# Patient Record
Sex: FEMALE | Race: WHITE | NOT HISPANIC OR LATINO | Employment: FULL TIME | URBAN - NONMETROPOLITAN AREA
[De-identification: names, ages, dates, MRNs, and addresses within clinical notes are randomized per-mention and may not be internally consistent; named-entity substitution may affect disease eponyms.]

---
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
## 2019-05-15 ENCOUNTER — TRANSCRIBE ORDERS (OUTPATIENT)
Dept: ADMINISTRATIVE | Facility: HOSPITAL | Age: 19
End: 2019-05-15

## 2019-05-15 DIAGNOSIS — J45.909 ASTHMA, ACUTE: Primary | ICD-10-CM

## 2019-06-04 ENCOUNTER — HOSPITAL ENCOUNTER (EMERGENCY)
Facility: HOSPITAL | Age: 19
Discharge: HOME | End: 2019-06-04
Admitting: EMERGENCY MEDICINE

## 2019-06-04 VITALS
SYSTOLIC BLOOD PRESSURE: 112 MMHG | TEMPERATURE: 98.8 F | OXYGEN SATURATION: 100 % | RESPIRATION RATE: 18 BRPM | BODY MASS INDEX: 31.34 KG/M2 | WEIGHT: 195 LBS | HEIGHT: 66 IN | HEART RATE: 84 BPM | DIASTOLIC BLOOD PRESSURE: 64 MMHG

## 2019-06-04 DIAGNOSIS — T78.40XA ALLERGIC REACTION, INITIAL ENCOUNTER: Primary | ICD-10-CM

## 2019-06-04 PROCEDURE — 99283 EMERGENCY DEPT VISIT LOW MDM: CPT

## 2019-06-04 PROCEDURE — 96372 THER/PROPH/DIAG INJ SC/IM: CPT

## 2019-06-04 PROCEDURE — 25010000002 DEXAMETHASONE SODIUM PHOSPHATE 20 MG/5ML SOLUTION: Performed by: NURSE PRACTITIONER

## 2019-06-04 RX ORDER — DEXAMETHASONE SODIUM PHOSPHATE 4 MG/ML
6 INJECTION, SOLUTION INTRA-ARTICULAR; INTRALESIONAL; INTRAMUSCULAR; INTRAVENOUS; SOFT TISSUE ONCE
Status: DISCONTINUED | OUTPATIENT
Start: 2019-06-04 | End: 2019-06-04

## 2019-06-04 RX ORDER — ATENOLOL 25 MG/1
25 TABLET ORAL EVERY MORNING
COMMUNITY
End: 2022-01-26

## 2019-06-04 RX ORDER — METHYLPREDNISOLONE 4 MG/1
TABLET ORAL
Qty: 1 EACH | Refills: 0 | Status: SHIPPED | OUTPATIENT
Start: 2019-06-04 | End: 2022-01-26

## 2019-06-04 RX ORDER — DEXAMETHASONE SODIUM PHOSPHATE 4 MG/ML
6 INJECTION, SOLUTION INTRA-ARTICULAR; INTRALESIONAL; INTRAMUSCULAR; INTRAVENOUS; SOFT TISSUE ONCE
Status: COMPLETED | OUTPATIENT
Start: 2019-06-04 | End: 2019-06-04

## 2019-06-04 RX ADMIN — DEXAMETHASONE SODIUM PHOSPHATE 6 MG: 4 INJECTION, SOLUTION INTRAMUSCULAR; INTRAVENOUS at 20:39

## 2019-06-05 ENCOUNTER — HOSPITAL ENCOUNTER (EMERGENCY)
Facility: HOSPITAL | Age: 19
Discharge: HOME | End: 2019-06-05
Attending: EMERGENCY MEDICINE | Admitting: EMERGENCY MEDICINE

## 2019-06-05 ENCOUNTER — HOSPITAL ENCOUNTER (EMERGENCY)
Facility: HOSPITAL | Age: 19
Discharge: HOME | End: 2019-06-05
Attending: FAMILY MEDICINE | Admitting: FAMILY MEDICINE

## 2019-06-05 VITALS
BODY MASS INDEX: 31.34 KG/M2 | HEART RATE: 112 BPM | TEMPERATURE: 98 F | WEIGHT: 195 LBS | RESPIRATION RATE: 16 BRPM | DIASTOLIC BLOOD PRESSURE: 76 MMHG | OXYGEN SATURATION: 99 % | SYSTOLIC BLOOD PRESSURE: 120 MMHG | HEIGHT: 66 IN

## 2019-06-05 VITALS
BODY MASS INDEX: 31.34 KG/M2 | DIASTOLIC BLOOD PRESSURE: 75 MMHG | TEMPERATURE: 98.2 F | WEIGHT: 195 LBS | RESPIRATION RATE: 18 BRPM | HEIGHT: 66 IN | HEART RATE: 108 BPM | OXYGEN SATURATION: 98 % | SYSTOLIC BLOOD PRESSURE: 125 MMHG

## 2019-06-05 DIAGNOSIS — L50.9 HIVES: Primary | ICD-10-CM

## 2019-06-05 LAB
ALBUMIN SERPL-MCNC: 4.15 G/DL (ref 3.5–5.2)
ALBUMIN SERPL-MCNC: 4.43 G/DL (ref 3.5–5.2)
ALBUMIN/GLOB SERPL: 1.2 G/DL
ALBUMIN/GLOB SERPL: 1.4 G/DL
ALP SERPL-CCNC: 77 U/L (ref 39–117)
ALP SERPL-CCNC: 82 U/L (ref 39–117)
ALT SERPL W P-5'-P-CCNC: 13 U/L (ref 1–33)
ALT SERPL W P-5'-P-CCNC: 18 U/L (ref 1–33)
ANION GAP SERPL CALCULATED.3IONS-SCNC: 14.4 MMOL/L
ANION GAP SERPL CALCULATED.3IONS-SCNC: 15.2 MMOL/L
AST SERPL-CCNC: 14 U/L (ref 1–32)
AST SERPL-CCNC: 17 U/L (ref 1–32)
B-HCG UR QL: NEGATIVE
BASOPHILS # BLD AUTO: 0.01 10*3/MM3 (ref 0–0.2)
BASOPHILS NFR BLD AUTO: 0 % (ref 0–1.5)
BILIRUB SERPL-MCNC: <0.2 MG/DL (ref 0.2–1.2)
BILIRUB SERPL-MCNC: <0.2 MG/DL (ref 0.2–1.2)
BUN BLD-MCNC: 14 MG/DL (ref 6–20)
BUN BLD-MCNC: 16 MG/DL (ref 6–20)
BUN/CREAT SERPL: 18.7 (ref 7–25)
BUN/CREAT SERPL: 20 (ref 7–25)
CALCIUM SPEC-SCNC: 9.3 MG/DL (ref 8.6–10.5)
CALCIUM SPEC-SCNC: 9.5 MG/DL (ref 8.6–10.5)
CHLORIDE SERPL-SCNC: 104 MMOL/L (ref 98–107)
CHLORIDE SERPL-SCNC: 106 MMOL/L (ref 98–107)
CO2 SERPL-SCNC: 18.6 MMOL/L (ref 22–29)
CO2 SERPL-SCNC: 19.8 MMOL/L (ref 22–29)
CREAT BLD-MCNC: 0.75 MG/DL (ref 0.57–1)
CREAT BLD-MCNC: 0.8 MG/DL (ref 0.57–1)
D-LACTATE SERPL-SCNC: 1.8 MMOL/L (ref 0.5–2)
DEPRECATED RDW RBC AUTO: 38.6 FL (ref 37–54)
DEPRECATED RDW RBC AUTO: 38.8 FL (ref 37–54)
EOSINOPHIL # BLD AUTO: 0 10*3/MM3 (ref 0–0.4)
EOSINOPHIL NFR BLD AUTO: 0 % (ref 0.3–6.2)
ERYTHROCYTE [DISTWIDTH] IN BLOOD BY AUTOMATED COUNT: 13.4 % (ref 12.3–15.4)
ERYTHROCYTE [DISTWIDTH] IN BLOOD BY AUTOMATED COUNT: 13.5 % (ref 12.3–15.4)
GFR SERPL CREATININE-BSD FRML MDRD: 100 ML/MIN/1.73
GFR SERPL CREATININE-BSD FRML MDRD: 92 ML/MIN/1.73
GLOBULIN UR ELPH-MCNC: 3.3 GM/DL
GLOBULIN UR ELPH-MCNC: 3.5 GM/DL
GLUCOSE BLD-MCNC: 141 MG/DL (ref 65–99)
GLUCOSE BLD-MCNC: 167 MG/DL (ref 65–99)
HCT VFR BLD AUTO: 38.9 % (ref 34–46.6)
HCT VFR BLD AUTO: 40.5 % (ref 34–46.6)
HGB BLD-MCNC: 12.6 G/DL (ref 12–15.9)
HGB BLD-MCNC: 13.2 G/DL (ref 12–15.9)
HOLD SPECIMEN: NORMAL
HOLD SPECIMEN: NORMAL
IMM GRANULOCYTES # BLD AUTO: 0.11 10*3/MM3 (ref 0–0.05)
IMM GRANULOCYTES NFR BLD AUTO: 0.5 % (ref 0–0.5)
LYMPHOCYTES # BLD AUTO: 1.53 10*3/MM3 (ref 0.7–3.1)
LYMPHOCYTES # BLD MANUAL: 1.37 10*3/MM3 (ref 0.7–3.1)
LYMPHOCYTES NFR BLD AUTO: 6.5 % (ref 19.6–45.3)
LYMPHOCYTES NFR BLD MANUAL: 3 % (ref 5–12)
LYMPHOCYTES NFR BLD MANUAL: 8 % (ref 19.6–45.3)
MCH RBC QN AUTO: 26.1 PG (ref 26.6–33)
MCH RBC QN AUTO: 26.2 PG (ref 26.6–33)
MCHC RBC AUTO-ENTMCNC: 32.4 G/DL (ref 31.5–35.7)
MCHC RBC AUTO-ENTMCNC: 32.6 G/DL (ref 31.5–35.7)
MCV RBC AUTO: 80.4 FL (ref 79–97)
MCV RBC AUTO: 80.5 FL (ref 79–97)
MONOCYTES # BLD AUTO: 0.16 10*3/MM3 (ref 0.1–0.9)
MONOCYTES # BLD AUTO: 0.51 10*3/MM3 (ref 0.1–0.9)
MONOCYTES NFR BLD AUTO: 0.7 % (ref 5–12)
NEUTROPHILS # BLD AUTO: 15.26 10*3/MM3 (ref 1.7–7)
NEUTROPHILS # BLD AUTO: 21.58 10*3/MM3 (ref 1.7–7)
NEUTROPHILS NFR BLD AUTO: 92.3 % (ref 42.7–76)
NEUTROPHILS NFR BLD MANUAL: 89 % (ref 42.7–76)
PLATELET # BLD AUTO: 358 10*3/MM3 (ref 140–450)
PLATELET # BLD AUTO: 375 10*3/MM3 (ref 140–450)
PMV BLD AUTO: 10.7 FL (ref 6–12)
PMV BLD AUTO: 11 FL (ref 6–12)
POTASSIUM BLD-SCNC: 4.1 MMOL/L (ref 3.5–5.2)
POTASSIUM BLD-SCNC: 4.5 MMOL/L (ref 3.5–5.2)
PROT SERPL-MCNC: 7.6 G/DL (ref 6–8.5)
PROT SERPL-MCNC: 7.7 G/DL (ref 6–8.5)
RBC # BLD AUTO: 4.83 10*6/MM3 (ref 3.77–5.28)
RBC # BLD AUTO: 5.04 10*6/MM3 (ref 3.77–5.28)
RBC MORPH BLD: NORMAL
SCAN SLIDE: NORMAL
SMALL PLATELETS BLD QL SMEAR: ADEQUATE
SODIUM BLD-SCNC: 139 MMOL/L (ref 136–145)
SODIUM BLD-SCNC: 139 MMOL/L (ref 136–145)
TSH SERPL DL<=0.05 MIU/L-ACNC: 0.89 MIU/ML (ref 0.27–4.2)
WBC NRBC COR # BLD: 17.15 10*3/MM3 (ref 3.4–10.8)
WBC NRBC COR # BLD: 23.39 10*3/MM3 (ref 3.4–10.8)
WHOLE BLOOD HOLD SPECIMEN: NORMAL
WHOLE BLOOD HOLD SPECIMEN: NORMAL

## 2019-06-05 PROCEDURE — 80053 COMPREHEN METABOLIC PANEL: CPT | Performed by: FAMILY MEDICINE

## 2019-06-05 PROCEDURE — 25010000002 DIPHENHYDRAMINE PER 50 MG: Performed by: PHYSICIAN ASSISTANT

## 2019-06-05 PROCEDURE — 85007 BL SMEAR W/DIFF WBC COUNT: CPT | Performed by: PHYSICIAN ASSISTANT

## 2019-06-05 PROCEDURE — 80053 COMPREHEN METABOLIC PANEL: CPT | Performed by: PHYSICIAN ASSISTANT

## 2019-06-05 PROCEDURE — 96375 TX/PRO/DX INJ NEW DRUG ADDON: CPT

## 2019-06-05 PROCEDURE — 25010000002 METHYLPREDNISOLONE PER 125 MG: Performed by: PHYSICIAN ASSISTANT

## 2019-06-05 PROCEDURE — 84443 ASSAY THYROID STIM HORMONE: CPT | Performed by: PHYSICIAN ASSISTANT

## 2019-06-05 PROCEDURE — 87040 BLOOD CULTURE FOR BACTERIA: CPT | Performed by: EMERGENCY MEDICINE

## 2019-06-05 PROCEDURE — 83605 ASSAY OF LACTIC ACID: CPT | Performed by: EMERGENCY MEDICINE

## 2019-06-05 PROCEDURE — 99284 EMERGENCY DEPT VISIT MOD MDM: CPT

## 2019-06-05 PROCEDURE — 85025 COMPLETE CBC W/AUTO DIFF WBC: CPT | Performed by: EMERGENCY MEDICINE

## 2019-06-05 PROCEDURE — 36415 COLL VENOUS BLD VENIPUNCTURE: CPT

## 2019-06-05 PROCEDURE — 96374 THER/PROPH/DIAG INJ IV PUSH: CPT

## 2019-06-05 PROCEDURE — 99283 EMERGENCY DEPT VISIT LOW MDM: CPT

## 2019-06-05 PROCEDURE — 85025 COMPLETE CBC W/AUTO DIFF WBC: CPT | Performed by: PHYSICIAN ASSISTANT

## 2019-06-05 PROCEDURE — 81025 URINE PREGNANCY TEST: CPT | Performed by: PHYSICIAN ASSISTANT

## 2019-06-05 RX ORDER — SODIUM CHLORIDE 0.9 % (FLUSH) 0.9 %
10 SYRINGE (ML) INJECTION AS NEEDED
Status: DISCONTINUED | OUTPATIENT
Start: 2019-06-05 | End: 2019-06-05

## 2019-06-05 RX ORDER — DIPHENHYDRAMINE HYDROCHLORIDE 50 MG/ML
25 INJECTION INTRAMUSCULAR; INTRAVENOUS ONCE
Status: COMPLETED | OUTPATIENT
Start: 2019-06-05 | End: 2019-06-05

## 2019-06-05 RX ORDER — METHYLPREDNISOLONE SODIUM SUCCINATE 125 MG/2ML
80 INJECTION, POWDER, LYOPHILIZED, FOR SOLUTION INTRAMUSCULAR; INTRAVENOUS ONCE
Status: COMPLETED | OUTPATIENT
Start: 2019-06-05 | End: 2019-06-05

## 2019-06-05 RX ORDER — FAMOTIDINE 10 MG/ML
20 INJECTION, SOLUTION INTRAVENOUS ONCE
Status: COMPLETED | OUTPATIENT
Start: 2019-06-05 | End: 2019-06-05

## 2019-06-05 RX ORDER — DIPHENHYDRAMINE HCL 25 MG
25 TABLET ORAL EVERY 6 HOURS PRN
Qty: 30 TABLET | Refills: 0 | Status: SHIPPED | OUTPATIENT
Start: 2019-06-05 | End: 2022-01-26

## 2019-06-05 RX ORDER — SODIUM CHLORIDE 0.9 % (FLUSH) 0.9 %
10 SYRINGE (ML) INJECTION AS NEEDED
Status: DISCONTINUED | OUTPATIENT
Start: 2019-06-05 | End: 2019-06-05 | Stop reason: HOSPADM

## 2019-06-05 RX ADMIN — METHYLPREDNISOLONE SODIUM SUCCINATE 80 MG: 125 INJECTION, POWDER, FOR SOLUTION INTRAMUSCULAR; INTRAVENOUS at 08:51

## 2019-06-05 RX ADMIN — DIPHENHYDRAMINE HYDROCHLORIDE 25 MG: 50 INJECTION INTRAMUSCULAR; INTRAVENOUS at 08:52

## 2019-06-05 RX ADMIN — FAMOTIDINE 20 MG: 10 INJECTION, SOLUTION INTRAVENOUS at 08:54

## 2019-06-05 NOTE — ED PROVIDER NOTES
Subjective   18-year-old white female presents secondary to allergic reaction.  She has rash to her arms and legs.  She states earlier she felt short of breath.  She was seen yesterday and given a steroid shot however she has not been taking antihistamines.  She states that her rash seems a little worse today.  She has been itching.  No difficulty swallowing.  No fever.  She was previously on penicillin due to strep and a double ear infection.  She states this is all resolved.  Symptoms are moderate.            Review of Systems   Constitutional: Negative.  Negative for fever.   HENT: Negative.    Respiratory: Negative.    Cardiovascular: Negative.  Negative for chest pain.   Gastrointestinal: Negative.  Negative for abdominal pain.   Endocrine: Negative.    Genitourinary: Negative.  Negative for dysuria.   Skin: Positive for rash.   Neurological: Negative.    Psychiatric/Behavioral: Negative.    All other systems reviewed and are negative.      Past Medical History:   Diagnosis Date   • Asthma    • Fast heart beat    • GERD (gastroesophageal reflux disease)        Allergies   Allergen Reactions   • Codeine Anaphylaxis   • Bactrim [Sulfamethoxazole-Trimethoprim] Hives   • Penicillins Rash       Past Surgical History:   Procedure Laterality Date   • D&C WITH SUCTION N/A 11/29/2018    Procedure: DILATATION AND CURETTAGE WITH SUCTION;  Surgeon: Estela Gupta DO;  Location: [facility];  Service: Obstetrics/Gynecology       Family History   Problem Relation Age of Onset   • Hypertension Father        Social History     Socioeconomic History   • Marital status: Single     Spouse name: Not on file   • Number of children: Not on file   • Years of education: Not on file   • Highest education level: Not on file   Tobacco Use   • Smoking status: Never Smoker   • Smokeless tobacco: Never Used   Substance and Sexual Activity   • Alcohol use: No   • Drug use: No   • Sexual activity: Yes     Birth control/protection: None            Objective   Physical Exam   Constitutional: She is oriented to person, place, and time. She appears well-developed and well-nourished. No distress.   HENT:   Head: Normocephalic and atraumatic.   Right Ear: External ear normal.   Left Ear: External ear normal.   Nose: Nose normal.   Eyes: Conjunctivae and EOM are normal. Pupils are equal, round, and reactive to light.   Neck: Normal range of motion. Neck supple. No JVD present. No tracheal deviation present.   Cardiovascular: Normal rate, regular rhythm and normal heart sounds.   No murmur heard.  Pulmonary/Chest: Effort normal and breath sounds normal. No respiratory distress. She has no wheezes.   Abdominal: Soft. Bowel sounds are normal. There is no tenderness.   Musculoskeletal: Normal range of motion. She exhibits no edema or deformity.   Neurological: She is alert and oriented to person, place, and time. No cranial nerve deficit.   Skin: Skin is warm and dry. No rash noted. She is not diaphoretic. No erythema. No pallor.   Hives to arms legs and torso.  No angioedema.  Lungs are clear.   Psychiatric: She has a normal mood and affect. Her behavior is normal. Thought content normal.   Nursing note and vitals reviewed.      Procedures           ED Course  ED Course as of Jun 05 1026 Wed Jun 05, 2019   0937 Patient's heart rate is 106.  She states that this is why she takes beta-blockers.  She states without beta-blockers her heart rate usually in the 130s 140s range.  She has not had fever.  Her white count is elevated however she has been on steroids.  She states that she was on penicillin for strep throat along with ear infections though this feels much better.  She is otherwise asymptomatic.  Her rash is resolving.  She has no shortness of breath at this time.  It is felt that patient is safe to be discharged on steroids, antihistamines and Pepcid.  She has been counseled about the signs and symptoms of worsening along with appropriate follow-up.   She does request to have a TSH added to her blood work due to her tachycardia and this has apparently not been checked in the past.  She was counseled that I will not probably see this result though she will follow-up with her primary care regarding it.  She is to continue her previously prescribed Medrol Dosepak along with Zantac which she takes chronically.  We will add Benadryl.   [JI]      ED Course User Index  [JI] Rolando Ferraro PA                  MDM  Number of Diagnoses or Management Options     Amount and/or Complexity of Data Reviewed  Decide to obtain previous medical records or to obtain history from someone other than the patient: yes          Final diagnoses:   Rolando Martínez PA  06/05/19 0942       Rolando Ferraro PA  06/05/19 1026

## 2019-06-05 NOTE — ED PROVIDER NOTES
Subjective   18-year-old female presents the ED today for an allergic reaction.  She states this started approximately 36 hours ago.  She has recently been on amoxicillin states she took her last dose 2 days ago.  She states she was seen in the ER yesterday and was prescribed Medrol Dosepak.  She states the rash came back this morning so she was seen in the ER again and received steroids, Pepcid and Benadryl in the ER and her rash completely resolved.  She states just prior to arrival she ate a pepperoni hot pocket and drank a glass of milk and her rash returned.  She states she has the rash on her arms and legs in her hands have been swelling.  She denies any known recent tick bites.  She denies any new chemical contacts.  She states she did take a dose of oral steroids after leaving the ER this morning.  Her last dose of Benadryl was around 9 AM this morning.  She denies any shortness of breath or difficulty swallowing.        History provided by:  Patient  Allergic Reaction   Presenting symptoms: itching and rash    Severity:  Moderate  Duration:  2 days  Prior allergic episodes:  Allergies to medications  Context: medications    Context: not chemicals, not cosmetics, not food allergies, not insect bite/sting, not new detergents/soaps and not poison ivy    Relieved by:  Antihistamines and steroids  Worsened by:  Nothing      Review of Systems   Constitutional: Negative.    HENT: Negative.    Eyes: Negative.    Respiratory: Negative.    Cardiovascular: Negative.    Gastrointestinal: Negative.    Genitourinary: Negative.    Musculoskeletal: Negative.    Skin: Positive for itching and rash.   Neurological: Negative.    Psychiatric/Behavioral: Negative.    All other systems reviewed and are negative.      Past Medical History:   Diagnosis Date   • Asthma    • Fast heart beat    • GERD (gastroesophageal reflux disease)        Allergies   Allergen Reactions   • Codeine Anaphylaxis   • Bactrim  [Sulfamethoxazole-Trimethoprim] Hives   • Penicillins Rash       Past Surgical History:   Procedure Laterality Date   • D&C WITH SUCTION N/A 11/29/2018    Procedure: DILATATION AND CURETTAGE WITH SUCTION;  Surgeon: Estela Gupta DO;  Location: [facility];  Service: Obstetrics/Gynecology       Family History   Problem Relation Age of Onset   • Hypertension Father        Social History     Socioeconomic History   • Marital status: Single     Spouse name: Not on file   • Number of children: Not on file   • Years of education: Not on file   • Highest education level: Not on file   Tobacco Use   • Smoking status: Never Smoker   • Smokeless tobacco: Never Used   Substance and Sexual Activity   • Alcohol use: No   • Drug use: No   • Sexual activity: Yes     Birth control/protection: None           Objective   Physical Exam   Constitutional: She is oriented to person, place, and time. She appears well-developed and well-nourished. No distress.   HENT:   Head: Normocephalic and atraumatic.   Right Ear: External ear normal.   Left Ear: External ear normal.   Nose: Nose normal.   Mouth/Throat: Oropharynx is clear and moist.   Eyes: Conjunctivae and EOM are normal. Pupils are equal, round, and reactive to light.   Neck: Normal range of motion. Neck supple.   Cardiovascular: Regular rhythm, normal heart sounds and intact distal pulses. Tachycardia present.   Pulmonary/Chest: Effort normal and breath sounds normal. No stridor. She has no wheezes.   Abdominal: Soft. Bowel sounds are normal. There is no tenderness.   Musculoskeletal: Normal range of motion.   Lymphadenopathy:     She has no cervical adenopathy.   Neurological: She is alert and oriented to person, place, and time.   Skin: Skin is warm. Capillary refill takes less than 2 seconds. Rash noted.   Mild urticarial rash to her bilateral lower arms.  No rash to trunk, face or lower extremities at this time.   Psychiatric: She has a normal mood and affect. Her behavior is  normal. Judgment and thought content normal.   Nursing note and vitals reviewed.      Procedures           ED Course  ED Course as of Jun 05 1558   Wed Jun 05, 2019   1532 Patient's rash is very mild, only on her arms at this time.  It is time for patient to have another dose of Benadryl.  She does not have a  at this time, she states she can take a dose upon arrival home.  I recommended that she follow up with her PCP to be referred to an allergist.  She will return to the ED if her symptoms change or worsen.  [AH]   1543 WBC count is elevated due to patient being on steroids.  No evidence of infection or sepsis.  []      ED Course User Index  [] Iram Syed PA                  MDM  Number of Diagnoses or Management Options  Hives:      Amount and/or Complexity of Data Reviewed  Clinical lab tests: reviewed  Decide to obtain previous medical records or to obtain history from someone other than the patient: yes    Patient Progress  Patient progress: improved        Final diagnoses:   Hives            Iram Syed PA  06/05/19 [id number]

## 2019-06-05 NOTE — ED PROVIDER NOTES
Subjective     History provided by:  Patient   used: No    Rash   Location:  Full body  Quality: itchiness and redness    Severity:  Moderate  Onset quality:  Sudden  Duration:  1 day  Timing:  Constant  Progression:  Waxing and waning  Chronicity:  New  Context: not animal contact, not diapers, not hot tub use, not new detergent/soap, not plant contact, not sick contacts and not sun exposure    Relieved by:  Nothing  Worsened by:  Nothing  Ineffective treatments:  None tried  Associated symptoms: no abdominal pain, no diarrhea, no fatigue, no headaches, no joint pain, no periorbital edema, no throat swelling, no tongue swelling, no URI, not vomiting and not wheezing        Review of Systems   Constitutional: Negative.  Negative for fatigue.   HENT: Negative.    Eyes: Negative.    Respiratory: Negative.  Negative for wheezing.    Cardiovascular: Negative.    Gastrointestinal: Negative.  Negative for abdominal pain, diarrhea and vomiting.   Endocrine: Negative.    Genitourinary: Negative.    Musculoskeletal: Negative.  Negative for arthralgias.   Skin: Positive for rash.   Allergic/Immunologic: Negative.    Neurological: Negative.  Negative for headaches.   Hematological: Negative.    Psychiatric/Behavioral: Negative.        Past Medical History:   Diagnosis Date   • Asthma    • Fast heart beat    • GERD (gastroesophageal reflux disease)        Allergies   Allergen Reactions   • Codeine Anaphylaxis   • Bactrim [Sulfamethoxazole-Trimethoprim] Hives   • Penicillins Rash       Past Surgical History:   Procedure Laterality Date   • D&C WITH SUCTION N/A 11/29/2018    Procedure: DILATATION AND CURETTAGE WITH SUCTION;  Surgeon: Estela Gupta DO;  Location: [facility];  Service: Obstetrics/Gynecology       Family History   Problem Relation Age of Onset   • Hypertension Father        Social History     Socioeconomic History   • Marital status: Single     Spouse name: Not on file   • Number of children:  Not on file   • Years of education: Not on file   • Highest education level: Not on file   Tobacco Use   • Smoking status: Never Smoker   • Smokeless tobacco: Never Used   Substance and Sexual Activity   • Alcohol use: No   • Drug use: No   • Sexual activity: Yes     Birth control/protection: None           Objective   Physical Exam   Constitutional: She is oriented to person, place, and time. She appears well-developed and well-nourished.   HENT:   Head: Normocephalic.   Right Ear: External ear normal.   Left Ear: External ear normal.   Mouth/Throat: Oropharynx is clear and moist.   Eyes: Conjunctivae and EOM are normal. Pupils are equal, round, and reactive to light.   Neck: Normal range of motion. Neck supple.   Cardiovascular: Normal rate, regular rhythm, normal heart sounds and intact distal pulses.   Pulmonary/Chest: Effort normal and breath sounds normal.   Abdominal: Soft. Bowel sounds are normal.   Musculoskeletal: Normal range of motion.   Neurological: She is alert and oriented to person, place, and time.   Skin: Skin is warm and dry. Capillary refill takes less than 2 seconds. Rash (generalized urticarial rash) noted.   Psychiatric: She has a normal mood and affect. Her behavior is normal. Thought content normal.   Nursing note and vitals reviewed.      Procedures           ED Course                  MDM      Final diagnoses:   Allergic reaction, initial encounter            Kael Christopher, APRN  06/04/19 2028

## 2019-06-05 NOTE — ED NOTES
"Pt became \"dizzy\" immediately after shot. Placed pt on strether and told her rest for a few minutes and explained what the vaso-vagal response is and why it happens. Pt verbalizes passing out after shots in the past.     Meme Beach RN  06/04/19 2044    "

## 2019-06-10 LAB
BACTERIA SPEC AEROBE CULT: NORMAL
BACTERIA SPEC AEROBE CULT: NORMAL

## 2019-12-11 ENCOUNTER — HOSPITAL ENCOUNTER (EMERGENCY)
Facility: HOSPITAL | Age: 19
Discharge: LEFT BEFORE DISCHARGE | End: 2019-12-11

## 2019-12-11 VITALS
HEART RATE: 101 BPM | HEIGHT: 65 IN | RESPIRATION RATE: 18 BRPM | TEMPERATURE: 98.9 F | BODY MASS INDEX: 31.65 KG/M2 | DIASTOLIC BLOOD PRESSURE: 79 MMHG | WEIGHT: 190 LBS | OXYGEN SATURATION: 97 % | SYSTOLIC BLOOD PRESSURE: 144 MMHG

## 2020-04-08 ENCOUNTER — LAB REQUISITION (OUTPATIENT)
Dept: LAB | Facility: HOSPITAL | Age: 20
End: 2020-04-08

## 2020-04-08 DIAGNOSIS — Z00.00 ROUTINE GENERAL MEDICAL EXAMINATION AT A HEALTH CARE FACILITY: ICD-10-CM

## 2020-04-08 PROCEDURE — 82731 ASSAY OF FETAL FIBRONECTIN: CPT | Performed by: PHYSICIAN ASSISTANT

## 2020-04-09 LAB — FIBRONECTIN FETAL VAG QL: NEGATIVE

## 2021-01-25 ENCOUNTER — HOSPITAL ENCOUNTER (EMERGENCY)
Facility: HOSPITAL | Age: 21
Discharge: LEFT BEFORE DISCHARGE | End: 2021-01-25

## 2021-01-25 VITALS
BODY MASS INDEX: 34.15 KG/M2 | SYSTOLIC BLOOD PRESSURE: 136 MMHG | DIASTOLIC BLOOD PRESSURE: 90 MMHG | OXYGEN SATURATION: 96 % | RESPIRATION RATE: 18 BRPM | HEIGHT: 64 IN | TEMPERATURE: 98.3 F | HEART RATE: 96 BPM | WEIGHT: 200 LBS

## 2021-01-25 PROCEDURE — 99211 OFF/OP EST MAY X REQ PHY/QHP: CPT

## 2021-11-16 ENCOUNTER — TRANSCRIBE ORDERS (OUTPATIENT)
Dept: ADMINISTRATIVE | Facility: HOSPITAL | Age: 21
End: 2021-11-16

## 2021-11-16 DIAGNOSIS — R11.0 NAUSEA: Primary | ICD-10-CM

## 2021-11-17 ENCOUNTER — TRANSCRIBE ORDERS (OUTPATIENT)
Dept: ADMINISTRATIVE | Facility: HOSPITAL | Age: 21
End: 2021-11-17

## 2021-11-17 DIAGNOSIS — R10.2 PELVIC PAIN IN FEMALE: Primary | ICD-10-CM

## 2021-11-30 ENCOUNTER — HOSPITAL ENCOUNTER (OUTPATIENT)
Dept: ULTRASOUND IMAGING | Facility: HOSPITAL | Age: 21
Discharge: HOME | End: 2021-11-30

## 2021-11-30 DIAGNOSIS — R11.0 NAUSEA: ICD-10-CM

## 2021-11-30 DIAGNOSIS — R10.2 PELVIC PAIN IN FEMALE: ICD-10-CM

## 2021-11-30 PROCEDURE — 76700 US EXAM ABDOM COMPLETE: CPT | Performed by: RADIOLOGY

## 2021-11-30 PROCEDURE — 76700 US EXAM ABDOM COMPLETE: CPT

## 2021-11-30 PROCEDURE — 76856 US EXAM PELVIC COMPLETE: CPT

## 2021-11-30 PROCEDURE — 76856 US EXAM PELVIC COMPLETE: CPT | Performed by: RADIOLOGY

## 2021-12-10 ENCOUNTER — TRANSCRIBE ORDERS (OUTPATIENT)
Dept: ADMINISTRATIVE | Facility: HOSPITAL | Age: 21
End: 2021-12-10

## 2021-12-10 DIAGNOSIS — R10.9 ABDOMINAL PAIN, UNSPECIFIED ABDOMINAL LOCATION: Primary | ICD-10-CM

## 2021-12-23 ENCOUNTER — HOSPITAL ENCOUNTER (OUTPATIENT)
Dept: NUCLEAR MEDICINE | Facility: HOSPITAL | Age: 21
Discharge: HOME | End: 2021-12-23

## 2021-12-23 DIAGNOSIS — R10.9 ABDOMINAL PAIN, UNSPECIFIED ABDOMINAL LOCATION: ICD-10-CM

## 2021-12-23 PROCEDURE — A9537 TC99M MEBROFENIN: HCPCS | Performed by: INTERNAL MEDICINE

## 2021-12-23 PROCEDURE — 0 TECHNETIUM TC 99M MEBROFENIN KIT: Performed by: INTERNAL MEDICINE

## 2021-12-23 PROCEDURE — 25010000002 SINCALIDE PER 5 MCG: Performed by: INTERNAL MEDICINE

## 2021-12-23 PROCEDURE — 78227 HEPATOBIL SYST IMAGE W/DRUG: CPT

## 2021-12-23 PROCEDURE — 78227 HEPATOBIL SYST IMAGE W/DRUG: CPT | Performed by: RADIOLOGY

## 2021-12-23 RX ORDER — KIT FOR THE PREPARATION OF TECHNETIUM TC 99M MEBROFENIN 45 MG/10ML
1 INJECTION, POWDER, LYOPHILIZED, FOR SOLUTION INTRAVENOUS
Status: COMPLETED | OUTPATIENT
Start: 2021-12-23 | End: 2021-12-23

## 2021-12-23 RX ADMIN — SINCALIDE 3.6 MCG: 5 INJECTION, POWDER, LYOPHILIZED, FOR SOLUTION INTRAVENOUS at 11:41

## 2021-12-23 RX ADMIN — MEBROFENIN 1 DOSE: 45 INJECTION, POWDER, LYOPHILIZED, FOR SOLUTION INTRAVENOUS at 10:58

## 2022-01-12 ENCOUNTER — HOSPITAL ENCOUNTER (OUTPATIENT)
Dept: GENERAL RADIOLOGY | Facility: HOSPITAL | Age: 22
Discharge: HOME | End: 2022-01-12
Admitting: INTERNAL MEDICINE

## 2022-01-12 ENCOUNTER — TRANSCRIBE ORDERS (OUTPATIENT)
Dept: ADMINISTRATIVE | Facility: HOSPITAL | Age: 22
End: 2022-01-12

## 2022-01-12 DIAGNOSIS — R19.7 DIARRHEA OF PRESUMED INFECTIOUS ORIGIN: ICD-10-CM

## 2022-01-12 DIAGNOSIS — R19.7 DIARRHEA OF PRESUMED INFECTIOUS ORIGIN: Primary | ICD-10-CM

## 2022-01-12 PROCEDURE — 74018 RADEX ABDOMEN 1 VIEW: CPT | Performed by: RADIOLOGY

## 2022-01-12 PROCEDURE — 74018 RADEX ABDOMEN 1 VIEW: CPT

## 2022-01-18 ENCOUNTER — OFFICE VISIT (OUTPATIENT)
Dept: SURGERY | Facility: CLINIC | Age: 22
End: 2022-01-18

## 2022-01-18 VITALS
DIASTOLIC BLOOD PRESSURE: 70 MMHG | WEIGHT: 210 LBS | SYSTOLIC BLOOD PRESSURE: 130 MMHG | HEART RATE: 66 BPM | HEIGHT: 64 IN | BODY MASS INDEX: 35.85 KG/M2

## 2022-01-18 DIAGNOSIS — K82.8 BILIARY DYSKINESIA: Primary | ICD-10-CM

## 2022-01-18 PROCEDURE — 99203 OFFICE O/P NEW LOW 30 MIN: CPT | Performed by: SURGERY

## 2022-01-18 RX ORDER — BUSPIRONE HYDROCHLORIDE 15 MG/1
TABLET ORAL
COMMUNITY
Start: 2021-11-04 | End: 2022-01-26

## 2022-01-18 RX ORDER — INDOCYANINE GREEN AND WATER 25 MG
2.5 KIT INJECTION ONCE
Status: CANCELLED | OUTPATIENT
Start: 2022-01-28 | End: 2022-01-18

## 2022-01-18 RX ORDER — OMEPRAZOLE 20 MG/1
20 CAPSULE, DELAYED RELEASE ORAL DAILY
COMMUNITY

## 2022-01-18 RX ORDER — RIMEGEPANT SULFATE 75 MG/75MG
TABLET, ORALLY DISINTEGRATING ORAL
COMMUNITY

## 2022-01-18 NOTE — PROGRESS NOTES
Subjective   Pricila Harden is a 21 y.o. female is being seen for consultation today at the request of Clover Leggett PA    Pricila Harden is a 21 y.o. female With right upper quadrant pain after fatty greasy meals.  HIDA scan shows ejection fraction of 33%.  No previous abdominal surgeries reported.  No nausea or vomiting.      Past Medical History:   Diagnosis Date   • Asthma    • Fast heart beat    • GERD (gastroesophageal reflux disease)        Family History   Problem Relation Age of Onset   • Hypertension Father        Social History     Socioeconomic History   • Marital status:    Tobacco Use   • Smoking status: Never Smoker   • Smokeless tobacco: Never Used   Vaping Use   • Vaping Use: Never used   Substance and Sexual Activity   • Alcohol use: No   • Drug use: No   • Sexual activity: Yes     Birth control/protection: None       Past Surgical History:   Procedure Laterality Date   • D & C WITH SUCTION N/A 11/29/2018    Procedure: DILATATION AND CURETTAGE WITH SUCTION;  Surgeon: Estela Gupta DO;  Location: [facility];  Service: Obstetrics/Gynecology       Review of Systems   Constitutional: Negative for activity change, appetite change, chills and fever.   HENT: Negative for sore throat and trouble swallowing.    Eyes: Negative for visual disturbance.   Respiratory: Negative for cough and shortness of breath.    Cardiovascular: Negative for chest pain and palpitations.   Gastrointestinal: Positive for abdominal pain. Negative for abdominal distention, blood in stool, constipation, diarrhea, nausea and vomiting.   Endocrine: Negative for cold intolerance and heat intolerance.   Genitourinary: Negative for dysuria.   Musculoskeletal: Negative for joint swelling.   Skin: Negative for color change, rash and wound.   Allergic/Immunologic: Negative for immunocompromised state.   Neurological: Negative for dizziness, seizures, weakness and headaches.   Hematological: Negative for adenopathy. Does  "not bruise/bleed easily.   Psychiatric/Behavioral: Negative for agitation and confusion.         /70   Pulse 66   Ht 162.6 cm (64.02\")   Wt 95.3 kg (210 lb)   BMI 36.03 kg/m²   Objective   Physical Exam  Constitutional:       Appearance: She is well-developed.   HENT:      Head: Normocephalic and atraumatic.   Eyes:      Conjunctiva/sclera: Conjunctivae normal.      Pupils: Pupils are equal, round, and reactive to light.   Neck:      Thyroid: No thyromegaly.      Vascular: No JVD.      Trachea: No tracheal deviation.   Cardiovascular:      Rate and Rhythm: Normal rate and regular rhythm.      Heart sounds: No murmur heard.  No friction rub. No gallop.    Pulmonary:      Effort: Pulmonary effort is normal.      Breath sounds: Normal breath sounds.   Abdominal:      General: There is no distension.      Palpations: Abdomen is soft. There is no hepatomegaly or splenomegaly.      Tenderness: There is no abdominal tenderness.      Hernia: No hernia is present.   Musculoskeletal:         General: No deformity. Normal range of motion.      Cervical back: Neck supple.   Skin:     General: Skin is warm and dry.   Neurological:      Mental Status: She is alert and oriented to person, place, and time.               Assessment   Diagnoses and all orders for this visit:    1. Biliary dyskinesia (Primary)  -     Case Request; Standing  -     COVID PRE-OP / PRE-PROCEDURE SCREENING ORDER (NO ISOLATION) - Swab, Nasopharynx; Future  -     CBC & Differential; Future  -     Comprehensive Metabolic Panel; Future  -     Case Request    Other orders  -     Follow Anesthesia Guidelines / Standing Orders; Future  -     Provide NPO Instructions to Patient; Future  -     Chlorhexidine Skin Prep; Future      Pricila TD Harden is a 21 y.o. female with biliary dyskinesia.  She understands the risks and benefits of the procedure and will proceed with cholecystectomy.    Patient's Body mass index is 36.03 kg/m². indicating that she is " morbidly obese (BMI > 40 or > 35 with obesity - related health condition). Obesity-related health conditions include the following: GERD. Obesity is unchanged. BMI is is above average; BMI management plan is completed. We discussed portion control and increasing exercise..

## 2022-01-19 ENCOUNTER — PATIENT ROUNDING (BHMG ONLY) (OUTPATIENT)
Dept: SURGERY | Facility: CLINIC | Age: 22
End: 2022-01-19

## 2022-01-19 NOTE — PROGRESS NOTES
January 19, 2022    Hello, may I speak with Pricila Harden?    My name is Amalia      I am  with [facility]  [address] [facility] [place] [id number] [phone].    Before we get started may I verify your date of birth? 2000    I am calling to officially welcome you to our practice and ask about your recent visit. Is this a good time to talk? yes    Tell me about your visit with us. What things went well?  Everything was fine.       We're always looking for ways to make our patients' experiences even better. Do you have recommendations on ways we may improve?  no    Overall were you satisfied with your first visit to our practice? yes       I appreciate you taking the time to speak with me today. Is there anything else I can do for you? Yes. Patient asked about covid testing prior. She was given preop and covid testing information.      Thank you, and have a great day.

## 2022-01-20 ENCOUNTER — TELEPHONE (OUTPATIENT)
Dept: SURGERY | Facility: CLINIC | Age: 22
End: 2022-01-20

## 2022-01-26 ENCOUNTER — PRE-ADMISSION TESTING (OUTPATIENT)
Dept: PREADMISSION TESTING | Facility: HOSPITAL | Age: 22
End: 2022-01-26

## 2022-01-26 ENCOUNTER — LAB (OUTPATIENT)
Dept: LAB | Facility: HOSPITAL | Age: 22
End: 2022-01-26

## 2022-01-26 DIAGNOSIS — K82.8 BILIARY DYSKINESIA: ICD-10-CM

## 2022-01-26 LAB
ALBUMIN SERPL-MCNC: 4.63 G/DL (ref 3.5–5.2)
ALBUMIN/GLOB SERPL: 1.4 G/DL
ALP SERPL-CCNC: 62 U/L (ref 39–117)
ALT SERPL W P-5'-P-CCNC: 14 U/L (ref 1–33)
ANION GAP SERPL CALCULATED.3IONS-SCNC: 11.2 MMOL/L (ref 5–15)
AST SERPL-CCNC: 16 U/L (ref 1–32)
BASOPHILS # BLD AUTO: 0.04 10*3/MM3 (ref 0–0.2)
BASOPHILS NFR BLD AUTO: 0.4 % (ref 0–1.5)
BILIRUB SERPL-MCNC: 0.2 MG/DL (ref 0–1.2)
BUN SERPL-MCNC: 11 MG/DL (ref 6–20)
BUN/CREAT SERPL: 14.5 (ref 7–25)
CALCIUM SPEC-SCNC: 9.6 MG/DL (ref 8.6–10.5)
CHLORIDE SERPL-SCNC: 100 MMOL/L (ref 98–107)
CO2 SERPL-SCNC: 25.8 MMOL/L (ref 22–29)
CREAT SERPL-MCNC: 0.76 MG/DL (ref 0.57–1)
DEPRECATED RDW RBC AUTO: 38.3 FL (ref 37–54)
EOSINOPHIL # BLD AUTO: 0.13 10*3/MM3 (ref 0–0.4)
EOSINOPHIL NFR BLD AUTO: 1.2 % (ref 0.3–6.2)
ERYTHROCYTE [DISTWIDTH] IN BLOOD BY AUTOMATED COUNT: 12.7 % (ref 12.3–15.4)
GFR SERPL CREATININE-BSD FRML MDRD: 95 ML/MIN/1.73
GLOBULIN UR ELPH-MCNC: 3.3 GM/DL
GLUCOSE SERPL-MCNC: 88 MG/DL (ref 65–99)
HCT VFR BLD AUTO: 42.7 % (ref 34–46.6)
HGB BLD-MCNC: 13.5 G/DL (ref 12–15.9)
IMM GRANULOCYTES # BLD AUTO: 0.03 10*3/MM3 (ref 0–0.05)
IMM GRANULOCYTES NFR BLD AUTO: 0.3 % (ref 0–0.5)
LYMPHOCYTES # BLD AUTO: 3.57 10*3/MM3 (ref 0.7–3.1)
LYMPHOCYTES NFR BLD AUTO: 33.9 % (ref 19.6–45.3)
MCH RBC QN AUTO: 26.1 PG (ref 26.6–33)
MCHC RBC AUTO-ENTMCNC: 31.6 G/DL (ref 31.5–35.7)
MCV RBC AUTO: 82.4 FL (ref 79–97)
MONOCYTES # BLD AUTO: 0.47 10*3/MM3 (ref 0.1–0.9)
MONOCYTES NFR BLD AUTO: 4.5 % (ref 5–12)
NEUTROPHILS NFR BLD AUTO: 59.7 % (ref 42.7–76)
NEUTROPHILS NFR BLD AUTO: 6.29 10*3/MM3 (ref 1.7–7)
NRBC BLD AUTO-RTO: 0 /100 WBC (ref 0–0.2)
PLATELET # BLD AUTO: 409 10*3/MM3 (ref 140–450)
PMV BLD AUTO: 10.5 FL (ref 6–12)
POTASSIUM SERPL-SCNC: 3.7 MMOL/L (ref 3.5–5.2)
PROT SERPL-MCNC: 7.9 G/DL (ref 6–8.5)
RBC # BLD AUTO: 5.18 10*6/MM3 (ref 3.77–5.28)
SODIUM SERPL-SCNC: 137 MMOL/L (ref 136–145)
WBC NRBC COR # BLD: 10.53 10*3/MM3 (ref 3.4–10.8)

## 2022-01-26 PROCEDURE — 36415 COLL VENOUS BLD VENIPUNCTURE: CPT

## 2022-01-26 PROCEDURE — 85025 COMPLETE CBC W/AUTO DIFF WBC: CPT

## 2022-01-26 PROCEDURE — U0004 COV-19 TEST NON-CDC HGH THRU: HCPCS | Performed by: SURGERY

## 2022-01-26 PROCEDURE — C9803 HOPD COVID-19 SPEC COLLECT: HCPCS

## 2022-01-26 PROCEDURE — 80053 COMPREHEN METABOLIC PANEL: CPT

## 2022-01-26 RX ORDER — PAROXETINE HYDROCHLORIDE 20 MG/1
20 TABLET, FILM COATED ORAL EVERY MORNING
COMMUNITY

## 2022-01-26 NOTE — DISCHARGE INSTRUCTIONS

## 2022-01-27 LAB — SARS-COV-2 RNA PNL SPEC NAA+PROBE: DETECTED

## 2022-03-29 ENCOUNTER — HOSPITAL ENCOUNTER (OUTPATIENT)
Facility: HOSPITAL | Age: 22
Setting detail: HOSPITAL OUTPATIENT SURGERY
End: 2022-03-29
Attending: SURGERY | Admitting: SURGERY

## 2022-03-29 ENCOUNTER — PREP FOR SURGERY (OUTPATIENT)
Dept: OTHER | Facility: HOSPITAL | Age: 22
End: 2022-03-29

## 2022-03-29 DIAGNOSIS — K82.8 BILIARY DYSKINESIA: Primary | ICD-10-CM

## 2022-03-29 RX ORDER — ACETAMINOPHEN 325 MG/1
650 TABLET ORAL ONCE
Status: CANCELLED | OUTPATIENT
Start: 2022-03-29 | End: 2022-03-29

## 2022-03-29 RX ORDER — SODIUM CHLORIDE 0.9 % (FLUSH) 0.9 %
10 SYRINGE (ML) INJECTION AS NEEDED
Status: CANCELLED | OUTPATIENT
Start: 2022-03-29

## 2022-03-29 RX ORDER — INDOCYANINE GREEN AND WATER 25 MG
2.5 KIT INJECTION ONCE
Status: CANCELLED | OUTPATIENT
Start: 2022-03-29 | End: 2022-03-29

## 2022-03-29 RX ORDER — SODIUM CHLORIDE 0.9 % (FLUSH) 0.9 %
3 SYRINGE (ML) INJECTION EVERY 12 HOURS SCHEDULED
Status: CANCELLED | OUTPATIENT
Start: 2022-03-29

## 2022-03-29 RX ORDER — METRONIDAZOLE 500 MG/100ML
500 INJECTION, SOLUTION INTRAVENOUS ONCE
Status: CANCELLED | OUTPATIENT
Start: 2022-03-29 | End: 2022-03-29

## 2022-04-22 ENCOUNTER — TELEPHONE (OUTPATIENT)
Dept: SURGERY | Facility: CLINIC | Age: 22
End: 2022-04-22

## 2023-09-12 ENCOUNTER — TRANSCRIBE ORDERS (OUTPATIENT)
Dept: ADMINISTRATIVE | Facility: HOSPITAL | Age: 23
End: 2023-09-12
Payer: COMMERCIAL

## 2023-09-12 ENCOUNTER — HOSPITAL ENCOUNTER (OUTPATIENT)
Dept: GENERAL RADIOLOGY | Facility: HOSPITAL | Age: 23
Discharge: HOME | End: 2023-09-12
Admitting: INTERNAL MEDICINE
Payer: COMMERCIAL

## 2023-09-12 DIAGNOSIS — M54.50 LOW BACK PAIN, UNSPECIFIED BACK PAIN LATERALITY, UNSPECIFIED CHRONICITY, UNSPECIFIED WHETHER SCIATICA PRESENT: Primary | ICD-10-CM

## 2023-09-12 DIAGNOSIS — M54.50 LOW BACK PAIN, UNSPECIFIED BACK PAIN LATERALITY, UNSPECIFIED CHRONICITY, UNSPECIFIED WHETHER SCIATICA PRESENT: ICD-10-CM

## 2023-09-12 PROCEDURE — 72110 X-RAY EXAM L-2 SPINE 4/>VWS: CPT

## 2023-09-17 NOTE — TELEPHONE ENCOUNTER
Talked with patient about surgery time, preop and covid testing, patient voiced understanding.    No

## 2024-01-23 ENCOUNTER — OFFICE VISIT (OUTPATIENT)
Age: 24
End: 2024-01-23
Payer: COMMERCIAL

## 2024-01-23 VITALS — HEIGHT: 64 IN | BODY MASS INDEX: 36.09 KG/M2 | WEIGHT: 211.4 LBS

## 2024-01-23 DIAGNOSIS — K82.8 BILIARY DYSKINESIA: Primary | ICD-10-CM

## 2024-01-23 RX ORDER — SODIUM CHLORIDE 9 MG/ML
40 INJECTION, SOLUTION INTRAVENOUS AS NEEDED
OUTPATIENT
Start: 2024-01-23

## 2024-01-23 RX ORDER — ACETAMINOPHEN 325 MG/1
650 TABLET ORAL ONCE
OUTPATIENT
Start: 2024-01-23 | End: 2024-01-23

## 2024-01-23 RX ORDER — METRONIDAZOLE 500 MG/100ML
500 INJECTION, SOLUTION INTRAVENOUS ONCE
OUTPATIENT
Start: 2024-01-23 | End: 2024-01-23

## 2024-01-23 RX ORDER — SODIUM CHLORIDE 0.9 % (FLUSH) 0.9 %
3 SYRINGE (ML) INJECTION EVERY 12 HOURS SCHEDULED
OUTPATIENT
Start: 2024-01-23

## 2024-01-23 RX ORDER — SODIUM CHLORIDE 0.9 % (FLUSH) 0.9 %
10 SYRINGE (ML) INJECTION AS NEEDED
OUTPATIENT
Start: 2024-01-23

## 2024-01-23 NOTE — H&P (VIEW-ONLY)
Subjective   Pricila Brown is a 23 y.o. female is being seen for consultation today at the request of Clover Leggett PA    Pricila Brown is a 23 y.o. female History of Present Illness  With right upper quadrant pain radiating to the right back for several months.  Symptoms worsened after delivery of her last child.  HIDA scan shows ejection fraction less than 35%.  History of dilation and curettage but no previous abdominal surgeries reported.      Past Medical History:   Diagnosis Date    Anxiety     Asthma     Dysfunctional gallbladder     Fast heart beat     GERD (gastroesophageal reflux disease)     Nausea     Pain     RUQ    PONV (postoperative nausea and vomiting)        Family History   Problem Relation Age of Onset    Hypertension Father        Social History     Socioeconomic History    Marital status:    Tobacco Use    Smoking status: Never    Smokeless tobacco: Never   Vaping Use    Vaping Use: Never used   Substance and Sexual Activity    Alcohol use: No    Drug use: No    Sexual activity: Yes     Birth control/protection: None       Past Surgical History:   Procedure Laterality Date    D & C WITH SUCTION N/A 11/29/2018    Procedure: DILATATION AND CURETTAGE WITH SUCTION;  Surgeon: Estela Gupta DO;  Location: [facility];  Service: Obstetrics/Gynecology       Review of Systems   Constitutional:  Negative for activity change, appetite change, chills and fever.   HENT:  Negative for sore throat and trouble swallowing.    Eyes:  Negative for visual disturbance.   Respiratory:  Negative for cough and shortness of breath.    Cardiovascular:  Negative for chest pain and palpitations.   Gastrointestinal:  Negative for abdominal distention, abdominal pain, blood in stool, constipation, diarrhea, nausea and vomiting.   Endocrine: Negative for cold intolerance and heat intolerance.   Genitourinary:  Negative for dysuria.   Musculoskeletal:  Negative for joint swelling.   Skin:  Negative for  "color change, rash and wound.   Allergic/Immunologic: Negative for immunocompromised state.   Neurological:  Negative for dizziness, seizures, weakness and headaches.   Hematological:  Negative for adenopathy. Does not bruise/bleed easily.   Psychiatric/Behavioral:  Negative for agitation and confusion.          Ht 162.6 cm (64\")   Wt 95.9 kg (211 lb 6.4 oz)   BMI 36.29 kg/m²   Objective   Physical Exam  Constitutional:       Appearance: She is well-developed.   HENT:      Head: Normocephalic and atraumatic.   Eyes:      Conjunctiva/sclera: Conjunctivae normal.      Pupils: Pupils are equal, round, and reactive to light.   Neck:      Thyroid: No thyromegaly.      Vascular: No JVD.      Trachea: No tracheal deviation.   Cardiovascular:      Rate and Rhythm: Normal rate and regular rhythm.      Heart sounds: No murmur heard.     No friction rub. No gallop.   Pulmonary:      Effort: Pulmonary effort is normal.      Breath sounds: Normal breath sounds.   Abdominal:      General: There is no distension.      Palpations: Abdomen is soft. There is no hepatomegaly or splenomegaly.      Tenderness: There is no abdominal tenderness.      Hernia: No hernia is present.   Musculoskeletal:         General: No deformity. Normal range of motion.      Cervical back: Neck supple.   Skin:     General: Skin is warm and dry.   Neurological:      Mental Status: She is alert and oriented to person, place, and time.               Assessment   Diagnoses and all orders for this visit:    1. Biliary dyskinesia (Primary)  -     Case Request; Standing  -     sodium chloride 0.9 % flush 3 mL  -     sodium chloride 0.9 % flush 10 mL  -     sodium chloride 0.9 % infusion 40 mL  -     acetaminophen (TYLENOL) tablet 650 mg  -     metroNIDAZOLE (FLAGYL) IVPB 500 mg  -     Case Request    Other orders  -     Follow Anesthesia Guidelines / Protocol; Future  -     Follow Anesthesia Guidelines / Protocol; Standing  -     Verify / Perform Chlorhexidine " Skin Prep; Standing  -     Verify / Perform Chlorhexidine Skin Prep if Indicated (If Not Already Completed); Standing  -     Obtain Informed Consent; Future  -     Provide NPO Instructions to Patient; Future  -     Chlorhexidine Skin Prep; Future  -     Insert Peripheral IV; Standing  -     Saline Lock & Maintain IV Access; Standing      Pricila Willow Brown is a 23 y.o. female with b    Class 2 Severe Obesity (BMI >=35 and <=39.9). Obesity-related health conditions include the following: none. Obesity is newly identified. BMI is is above average; BMI management plan is completed. We discussed portion control and increasing exercise.

## 2024-01-23 NOTE — PROGRESS NOTES
Subjective   Pricila Brown is a 23 y.o. female is being seen for consultation today at the request of Clover Leggett PA    Pricila Brown is a 23 y.o. female History of Present Illness  With right upper quadrant pain radiating to the right back for several months.  Symptoms worsened after delivery of her last child.  HIDA scan shows ejection fraction less than 35%.  History of dilation and curettage but no previous abdominal surgeries reported.      Past Medical History:   Diagnosis Date    Anxiety     Asthma     Dysfunctional gallbladder     Fast heart beat     GERD (gastroesophageal reflux disease)     Nausea     Pain     RUQ    PONV (postoperative nausea and vomiting)        Family History   Problem Relation Age of Onset    Hypertension Father        Social History     Socioeconomic History    Marital status:    Tobacco Use    Smoking status: Never    Smokeless tobacco: Never   Vaping Use    Vaping Use: Never used   Substance and Sexual Activity    Alcohol use: No    Drug use: No    Sexual activity: Yes     Birth control/protection: None       Past Surgical History:   Procedure Laterality Date    D & C WITH SUCTION N/A 11/29/2018    Procedure: DILATATION AND CURETTAGE WITH SUCTION;  Surgeon: Estela Gupta DO;  Location: [facility];  Service: Obstetrics/Gynecology       Review of Systems   Constitutional:  Negative for activity change, appetite change, chills and fever.   HENT:  Negative for sore throat and trouble swallowing.    Eyes:  Negative for visual disturbance.   Respiratory:  Negative for cough and shortness of breath.    Cardiovascular:  Negative for chest pain and palpitations.   Gastrointestinal:  Negative for abdominal distention, abdominal pain, blood in stool, constipation, diarrhea, nausea and vomiting.   Endocrine: Negative for cold intolerance and heat intolerance.   Genitourinary:  Negative for dysuria.   Musculoskeletal:  Negative for joint swelling.   Skin:  Negative for  "color change, rash and wound.   Allergic/Immunologic: Negative for immunocompromised state.   Neurological:  Negative for dizziness, seizures, weakness and headaches.   Hematological:  Negative for adenopathy. Does not bruise/bleed easily.   Psychiatric/Behavioral:  Negative for agitation and confusion.          Ht 162.6 cm (64\")   Wt 95.9 kg (211 lb 6.4 oz)   BMI 36.29 kg/m²   Objective   Physical Exam  Constitutional:       Appearance: She is well-developed.   HENT:      Head: Normocephalic and atraumatic.   Eyes:      Conjunctiva/sclera: Conjunctivae normal.      Pupils: Pupils are equal, round, and reactive to light.   Neck:      Thyroid: No thyromegaly.      Vascular: No JVD.      Trachea: No tracheal deviation.   Cardiovascular:      Rate and Rhythm: Normal rate and regular rhythm.      Heart sounds: No murmur heard.     No friction rub. No gallop.   Pulmonary:      Effort: Pulmonary effort is normal.      Breath sounds: Normal breath sounds.   Abdominal:      General: There is no distension.      Palpations: Abdomen is soft. There is no hepatomegaly or splenomegaly.      Tenderness: There is no abdominal tenderness.      Hernia: No hernia is present.   Musculoskeletal:         General: No deformity. Normal range of motion.      Cervical back: Neck supple.   Skin:     General: Skin is warm and dry.   Neurological:      Mental Status: She is alert and oriented to person, place, and time.               Assessment   Diagnoses and all orders for this visit:    1. Biliary dyskinesia (Primary)  -     Case Request; Standing  -     sodium chloride 0.9 % flush 3 mL  -     sodium chloride 0.9 % flush 10 mL  -     sodium chloride 0.9 % infusion 40 mL  -     acetaminophen (TYLENOL) tablet 650 mg  -     metroNIDAZOLE (FLAGYL) IVPB 500 mg  -     Case Request    Other orders  -     Follow Anesthesia Guidelines / Protocol; Future  -     Follow Anesthesia Guidelines / Protocol; Standing  -     Verify / Perform Chlorhexidine " Skin Prep; Standing  -     Verify / Perform Chlorhexidine Skin Prep if Indicated (If Not Already Completed); Standing  -     Obtain Informed Consent; Future  -     Provide NPO Instructions to Patient; Future  -     Chlorhexidine Skin Prep; Future  -     Insert Peripheral IV; Standing  -     Saline Lock & Maintain IV Access; Standing      Pricila Willow Brown is a 23 y.o. female with b    Class 2 Severe Obesity (BMI >=35 and <=39.9). Obesity-related health conditions include the following: none. Obesity is newly identified. BMI is is above average; BMI management plan is completed. We discussed portion control and increasing exercise.

## 2024-01-26 ENCOUNTER — DOCUMENTATION (OUTPATIENT)
Dept: SURGERY | Facility: CLINIC | Age: 24
End: 2024-01-26
Payer: COMMERCIAL

## 2024-01-30 NOTE — DISCHARGE INSTRUCTIONS

## 2024-01-31 ENCOUNTER — PRE-ADMISSION TESTING (OUTPATIENT)
Dept: PREADMISSION TESTING | Facility: HOSPITAL | Age: 24
End: 2024-01-31
Payer: COMMERCIAL

## 2024-01-31 LAB
ANION GAP SERPL CALCULATED.3IONS-SCNC: 10.4 MMOL/L (ref 5–15)
BUN SERPL-MCNC: 9 MG/DL (ref 6–20)
BUN/CREAT SERPL: 12 (ref 7–25)
CALCIUM SPEC-SCNC: 9.2 MG/DL (ref 8.6–10.5)
CHLORIDE SERPL-SCNC: 105 MMOL/L (ref 98–107)
CO2 SERPL-SCNC: 24.6 MMOL/L (ref 22–29)
CREAT SERPL-MCNC: 0.75 MG/DL (ref 0.57–1)
DEPRECATED RDW RBC AUTO: 42.5 FL (ref 37–54)
EGFRCR SERPLBLD CKD-EPI 2021: 114.2 ML/MIN/1.73
ERYTHROCYTE [DISTWIDTH] IN BLOOD BY AUTOMATED COUNT: 14 % (ref 12.3–15.4)
GLUCOSE SERPL-MCNC: 87 MG/DL (ref 65–99)
HCT VFR BLD AUTO: 42 % (ref 34–46.6)
HGB BLD-MCNC: 13 G/DL (ref 12–15.9)
MCH RBC QN AUTO: 25.7 PG (ref 26.6–33)
MCHC RBC AUTO-ENTMCNC: 31 G/DL (ref 31.5–35.7)
MCV RBC AUTO: 83 FL (ref 79–97)
PLATELET # BLD AUTO: 342 10*3/MM3 (ref 140–450)
PMV BLD AUTO: 11.3 FL (ref 6–12)
POTASSIUM SERPL-SCNC: 3.6 MMOL/L (ref 3.5–5.2)
RBC # BLD AUTO: 5.06 10*6/MM3 (ref 3.77–5.28)
SODIUM SERPL-SCNC: 140 MMOL/L (ref 136–145)
WBC NRBC COR # BLD AUTO: 5.44 10*3/MM3 (ref 3.4–10.8)

## 2024-01-31 PROCEDURE — 80048 BASIC METABOLIC PNL TOTAL CA: CPT

## 2024-01-31 PROCEDURE — 36415 COLL VENOUS BLD VENIPUNCTURE: CPT

## 2024-01-31 PROCEDURE — 85027 COMPLETE CBC AUTOMATED: CPT

## 2024-01-31 RX ORDER — ALBUTEROL SULFATE 90 UG/1
2 AEROSOL, METERED RESPIRATORY (INHALATION) EVERY 4 HOURS PRN
COMMUNITY

## 2024-01-31 RX ORDER — BUSPIRONE HYDROCHLORIDE 10 MG/1
10 TABLET ORAL DAILY PRN
COMMUNITY

## 2024-02-02 ENCOUNTER — APPOINTMENT (OUTPATIENT)
Dept: GENERAL RADIOLOGY | Facility: HOSPITAL | Age: 24
End: 2024-02-02
Payer: COMMERCIAL

## 2024-02-02 ENCOUNTER — ANESTHESIA EVENT (OUTPATIENT)
Dept: PERIOP | Facility: HOSPITAL | Age: 24
End: 2024-02-02
Payer: COMMERCIAL

## 2024-02-02 ENCOUNTER — ANESTHESIA (OUTPATIENT)
Dept: PERIOP | Facility: HOSPITAL | Age: 24
End: 2024-02-02
Payer: COMMERCIAL

## 2024-02-02 ENCOUNTER — HOSPITAL ENCOUNTER (OUTPATIENT)
Facility: HOSPITAL | Age: 24
Setting detail: HOSPITAL OUTPATIENT SURGERY
Discharge: HOME | End: 2024-02-02
Attending: SURGERY | Admitting: SURGERY
Payer: COMMERCIAL

## 2024-02-02 VITALS
HEART RATE: 79 BPM | OXYGEN SATURATION: 99 % | HEIGHT: 64 IN | DIASTOLIC BLOOD PRESSURE: 61 MMHG | RESPIRATION RATE: 18 BRPM | WEIGHT: 207.6 LBS | TEMPERATURE: 97.3 F | BODY MASS INDEX: 35.44 KG/M2 | SYSTOLIC BLOOD PRESSURE: 135 MMHG

## 2024-02-02 DIAGNOSIS — K82.8 BILIARY DYSKINESIA: ICD-10-CM

## 2024-02-02 LAB
B-HCG UR QL: NEGATIVE
EXPIRATION DATE: NORMAL
INTERNAL NEGATIVE CONTROL: NEGATIVE
INTERNAL POSITIVE CONTROL: POSITIVE
Lab: NORMAL

## 2024-02-02 PROCEDURE — 25010000002 PROPOFOL 200 MG/20ML EMULSION: Performed by: NURSE ANESTHETIST, CERTIFIED REGISTERED

## 2024-02-02 PROCEDURE — 47562 LAPAROSCOPIC CHOLECYSTECTOMY: CPT | Performed by: SURGERY

## 2024-02-02 PROCEDURE — 25810000003 SODIUM CHLORIDE PER 500 ML: Performed by: SURGERY

## 2024-02-02 PROCEDURE — 25010000002 INDOCYANINE GREEN 25 MG RECONSTITUTED SOLUTION: Performed by: SURGERY

## 2024-02-02 PROCEDURE — S2900 ROBOTIC SURGICAL SYSTEM: HCPCS | Performed by: SURGERY

## 2024-02-02 PROCEDURE — 81025 URINE PREGNANCY TEST: CPT | Performed by: ANESTHESIOLOGY

## 2024-02-02 PROCEDURE — 25010000002 ROPIVACAINE PER 1 MG: Performed by: ANESTHESIOLOGY

## 2024-02-02 PROCEDURE — 25810000003 LACTATED RINGERS PER 1000 ML: Performed by: ANESTHESIOLOGY

## 2024-02-02 PROCEDURE — 25010000002 MIDAZOLAM PER 1 MG: Performed by: NURSE ANESTHETIST, CERTIFIED REGISTERED

## 2024-02-02 PROCEDURE — 25010000002 ONDANSETRON PER 1 MG: Performed by: NURSE ANESTHETIST, CERTIFIED REGISTERED

## 2024-02-02 PROCEDURE — 25010000002 FENTANYL CITRATE (PF) 50 MCG/ML SOLUTION: Performed by: NURSE ANESTHETIST, CERTIFIED REGISTERED

## 2024-02-02 PROCEDURE — 63710000001 ONDANSETRON ODT 4 MG TABLET DISPERSIBLE: Performed by: ANESTHESIOLOGY

## 2024-02-02 PROCEDURE — 25010000002 DEXAMETHASONE PER 1 MG: Performed by: ANESTHESIOLOGY

## 2024-02-02 PROCEDURE — 25010000002 MEPERIDINE PER 100 MG: Performed by: NURSE ANESTHETIST, CERTIFIED REGISTERED

## 2024-02-02 PROCEDURE — 25010000002 BUPRENORPHINE PER 0.1 MG: Performed by: ANESTHESIOLOGY

## 2024-02-02 DEVICE — CLIP LIG HEMOLOK PA LG 6CT PRP: Type: IMPLANTABLE DEVICE | Site: ABDOMEN | Status: FUNCTIONAL

## 2024-02-02 RX ORDER — TRAMADOL HYDROCHLORIDE 50 MG/1
50 TABLET ORAL EVERY 8 HOURS PRN
Qty: 10 TABLET | Refills: 0 | Status: SHIPPED | OUTPATIENT
Start: 2024-02-02

## 2024-02-02 RX ORDER — ROCURONIUM BROMIDE 10 MG/ML
INJECTION, SOLUTION INTRAVENOUS AS NEEDED
Status: DISCONTINUED | OUTPATIENT
Start: 2024-02-02 | End: 2024-02-02 | Stop reason: SURG

## 2024-02-02 RX ORDER — SODIUM CHLORIDE, SODIUM LACTATE, POTASSIUM CHLORIDE, CALCIUM CHLORIDE 600; 310; 30; 20 MG/100ML; MG/100ML; MG/100ML; MG/100ML
125 INJECTION, SOLUTION INTRAVENOUS ONCE
Status: COMPLETED | OUTPATIENT
Start: 2024-02-02 | End: 2024-02-02

## 2024-02-02 RX ORDER — FAMOTIDINE 10 MG/ML
INJECTION, SOLUTION INTRAVENOUS AS NEEDED
Status: DISCONTINUED | OUTPATIENT
Start: 2024-02-02 | End: 2024-02-02 | Stop reason: SURG

## 2024-02-02 RX ORDER — MIDAZOLAM HYDROCHLORIDE 1 MG/ML
INJECTION INTRAMUSCULAR; INTRAVENOUS AS NEEDED
Status: DISCONTINUED | OUTPATIENT
Start: 2024-02-02 | End: 2024-02-02 | Stop reason: SURG

## 2024-02-02 RX ORDER — ACETAMINOPHEN 325 MG/1
650 TABLET ORAL ONCE
Status: COMPLETED | OUTPATIENT
Start: 2024-02-02 | End: 2024-02-02

## 2024-02-02 RX ORDER — SODIUM CHLORIDE, SODIUM LACTATE, POTASSIUM CHLORIDE, CALCIUM CHLORIDE 600; 310; 30; 20 MG/100ML; MG/100ML; MG/100ML; MG/100ML
100 INJECTION, SOLUTION INTRAVENOUS ONCE AS NEEDED
Status: DISCONTINUED | OUTPATIENT
Start: 2024-02-02 | End: 2024-02-02 | Stop reason: HOSPADM

## 2024-02-02 RX ORDER — ONDANSETRON 2 MG/ML
4 INJECTION INTRAMUSCULAR; INTRAVENOUS AS NEEDED
Status: DISCONTINUED | OUTPATIENT
Start: 2024-02-02 | End: 2024-02-02 | Stop reason: HOSPADM

## 2024-02-02 RX ORDER — SODIUM CHLORIDE 0.9 % (FLUSH) 0.9 %
10 SYRINGE (ML) INJECTION EVERY 12 HOURS SCHEDULED
Status: DISCONTINUED | OUTPATIENT
Start: 2024-02-02 | End: 2024-02-02 | Stop reason: HOSPADM

## 2024-02-02 RX ORDER — MIDAZOLAM HYDROCHLORIDE 1 MG/ML
1 INJECTION INTRAMUSCULAR; INTRAVENOUS
Status: DISCONTINUED | OUTPATIENT
Start: 2024-02-02 | End: 2024-02-02 | Stop reason: HOSPADM

## 2024-02-02 RX ORDER — BUPRENORPHINE HYDROCHLORIDE 0.32 MG/ML
INJECTION INTRAMUSCULAR; INTRAVENOUS
Status: COMPLETED | OUTPATIENT
Start: 2024-02-02 | End: 2024-02-02

## 2024-02-02 RX ORDER — SODIUM CHLORIDE 0.9 % (FLUSH) 0.9 %
10 SYRINGE (ML) INJECTION AS NEEDED
Status: DISCONTINUED | OUTPATIENT
Start: 2024-02-02 | End: 2024-02-02 | Stop reason: HOSPADM

## 2024-02-02 RX ORDER — SODIUM CHLORIDE 9 MG/ML
INJECTION, SOLUTION INTRAVENOUS AS NEEDED
Status: DISCONTINUED | OUTPATIENT
Start: 2024-02-02 | End: 2024-02-02 | Stop reason: HOSPADM

## 2024-02-02 RX ORDER — LIDOCAINE HYDROCHLORIDE 20 MG/ML
INJECTION, SOLUTION EPIDURAL; INFILTRATION; INTRACAUDAL; PERINEURAL AS NEEDED
Status: DISCONTINUED | OUTPATIENT
Start: 2024-02-02 | End: 2024-02-02 | Stop reason: SURG

## 2024-02-02 RX ORDER — ROPIVACAINE HYDROCHLORIDE 5 MG/ML
INJECTION, SOLUTION EPIDURAL; INFILTRATION; PERINEURAL
Status: COMPLETED | OUTPATIENT
Start: 2024-02-02 | End: 2024-02-02

## 2024-02-02 RX ORDER — MEPERIDINE HYDROCHLORIDE 25 MG/ML
12.5 INJECTION INTRAMUSCULAR; INTRAVENOUS; SUBCUTANEOUS
Status: COMPLETED | OUTPATIENT
Start: 2024-02-02 | End: 2024-02-02

## 2024-02-02 RX ORDER — ACETAMINOPHEN 325 MG/1
650 TABLET ORAL EVERY 4 HOURS PRN
Qty: 30 TABLET | Refills: 0 | Status: SHIPPED | OUTPATIENT
Start: 2024-02-02

## 2024-02-02 RX ORDER — PROPOFOL 10 MG/ML
INJECTION, EMULSION INTRAVENOUS AS NEEDED
Status: DISCONTINUED | OUTPATIENT
Start: 2024-02-02 | End: 2024-02-02 | Stop reason: SURG

## 2024-02-02 RX ORDER — OXYCODONE HYDROCHLORIDE AND ACETAMINOPHEN 5; 325 MG/1; MG/1
1 TABLET ORAL ONCE AS NEEDED
Status: COMPLETED | OUTPATIENT
Start: 2024-02-02 | End: 2024-02-02

## 2024-02-02 RX ORDER — SODIUM CHLORIDE 9 MG/ML
40 INJECTION, SOLUTION INTRAVENOUS AS NEEDED
Status: DISCONTINUED | OUTPATIENT
Start: 2024-02-02 | End: 2024-02-02 | Stop reason: HOSPADM

## 2024-02-02 RX ORDER — FENTANYL CITRATE 50 UG/ML
50 INJECTION, SOLUTION INTRAMUSCULAR; INTRAVENOUS
Status: DISCONTINUED | OUTPATIENT
Start: 2024-02-02 | End: 2024-02-02 | Stop reason: HOSPADM

## 2024-02-02 RX ORDER — KETOROLAC TROMETHAMINE 30 MG/ML
30 INJECTION, SOLUTION INTRAMUSCULAR; INTRAVENOUS EVERY 6 HOURS PRN
Status: DISCONTINUED | OUTPATIENT
Start: 2024-02-02 | End: 2024-02-02 | Stop reason: HOSPADM

## 2024-02-02 RX ORDER — INDOCYANINE GREEN AND WATER 25 MG
2.5 KIT INJECTION ONCE
Status: COMPLETED | OUTPATIENT
Start: 2024-02-02 | End: 2024-02-02

## 2024-02-02 RX ORDER — IPRATROPIUM BROMIDE AND ALBUTEROL SULFATE 2.5; .5 MG/3ML; MG/3ML
3 SOLUTION RESPIRATORY (INHALATION) ONCE AS NEEDED
Status: DISCONTINUED | OUTPATIENT
Start: 2024-02-02 | End: 2024-02-02 | Stop reason: HOSPADM

## 2024-02-02 RX ORDER — METRONIDAZOLE 500 MG/100ML
500 INJECTION, SOLUTION INTRAVENOUS ONCE
Qty: 100 ML | Refills: 0 | Status: COMPLETED | OUTPATIENT
Start: 2024-02-02 | End: 2024-02-02

## 2024-02-02 RX ORDER — ONDANSETRON 4 MG/1
4 TABLET, ORALLY DISINTEGRATING ORAL EVERY 6 HOURS PRN
Status: DISCONTINUED | OUTPATIENT
Start: 2024-02-02 | End: 2024-02-02 | Stop reason: HOSPADM

## 2024-02-02 RX ORDER — INDOCYANINE GREEN AND WATER 25 MG
7.5 KIT INJECTION ONCE
Status: DISCONTINUED | OUTPATIENT
Start: 2024-02-02 | End: 2024-02-02

## 2024-02-02 RX ORDER — IBUPROFEN 600 MG/1
600 TABLET ORAL EVERY 6 HOURS PRN
Qty: 30 TABLET | Refills: 0 | Status: SHIPPED | OUTPATIENT
Start: 2024-02-02

## 2024-02-02 RX ORDER — DEXAMETHASONE SODIUM PHOSPHATE 4 MG/ML
INJECTION, SOLUTION INTRA-ARTICULAR; INTRALESIONAL; INTRAMUSCULAR; INTRAVENOUS; SOFT TISSUE
Status: COMPLETED | OUTPATIENT
Start: 2024-02-02 | End: 2024-02-02

## 2024-02-02 RX ORDER — ONDANSETRON 2 MG/ML
INJECTION INTRAMUSCULAR; INTRAVENOUS AS NEEDED
Status: DISCONTINUED | OUTPATIENT
Start: 2024-02-02 | End: 2024-02-02 | Stop reason: SURG

## 2024-02-02 RX ORDER — FENTANYL CITRATE 50 UG/ML
INJECTION, SOLUTION INTRAMUSCULAR; INTRAVENOUS AS NEEDED
Status: DISCONTINUED | OUTPATIENT
Start: 2024-02-02 | End: 2024-02-02 | Stop reason: SURG

## 2024-02-02 RX ORDER — SODIUM CHLORIDE 0.9 % (FLUSH) 0.9 %
3 SYRINGE (ML) INJECTION EVERY 12 HOURS SCHEDULED
Status: DISCONTINUED | OUTPATIENT
Start: 2024-02-02 | End: 2024-02-02 | Stop reason: HOSPADM

## 2024-02-02 RX ADMIN — MEPERIDINE HYDROCHLORIDE 12.5 MG: 25 INJECTION INTRAMUSCULAR; INTRAVENOUS; SUBCUTANEOUS at 10:54

## 2024-02-02 RX ADMIN — MIDAZOLAM HYDROCHLORIDE 2 MG: 1 INJECTION, SOLUTION INTRAMUSCULAR; INTRAVENOUS at 09:46

## 2024-02-02 RX ADMIN — FENTANYL CITRATE 50 MCG: 50 INJECTION INTRAMUSCULAR; INTRAVENOUS at 09:50

## 2024-02-02 RX ADMIN — MEPERIDINE HYDROCHLORIDE 12.5 MG: 25 INJECTION INTRAMUSCULAR; INTRAVENOUS; SUBCUTANEOUS at 10:49

## 2024-02-02 RX ADMIN — ROCURONIUM BROMIDE 25 MG: 10 SOLUTION INTRAVENOUS at 09:50

## 2024-02-02 RX ADMIN — LIDOCAINE HYDROCHLORIDE 40 MG: 20 INJECTION, SOLUTION EPIDURAL; INFILTRATION; INTRACAUDAL; PERINEURAL at 09:50

## 2024-02-02 RX ADMIN — FAMOTIDINE 20 MG: 10 INJECTION, SOLUTION INTRAVENOUS at 09:46

## 2024-02-02 RX ADMIN — SODIUM CHLORIDE, POTASSIUM CHLORIDE, SODIUM LACTATE AND CALCIUM CHLORIDE: 600; 310; 30; 20 INJECTION, SOLUTION INTRAVENOUS at 09:46

## 2024-02-02 RX ADMIN — ACETAMINOPHEN 650 MG: 325 TABLET ORAL at 09:05

## 2024-02-02 RX ADMIN — ONDANSETRON 4 MG: 4 TABLET, ORALLY DISINTEGRATING ORAL at 11:20

## 2024-02-02 RX ADMIN — ONDANSETRON 4 MG: 2 INJECTION INTRAMUSCULAR; INTRAVENOUS at 09:50

## 2024-02-02 RX ADMIN — FENTANYL CITRATE 50 MCG: 50 INJECTION, SOLUTION INTRAMUSCULAR; INTRAVENOUS at 10:47

## 2024-02-02 RX ADMIN — INDOCYANINE GREEN AND WATER 2.5 MG: KIT at 09:07

## 2024-02-02 RX ADMIN — PROPOFOL 150 MG: 10 INJECTION, EMULSION INTRAVENOUS at 09:50

## 2024-02-02 RX ADMIN — METRONIDAZOLE 500 MG: 500 INJECTION, SOLUTION INTRAVENOUS at 09:56

## 2024-02-02 RX ADMIN — FENTANYL CITRATE 50 MCG: 50 INJECTION INTRAMUSCULAR; INTRAVENOUS at 10:02

## 2024-02-02 RX ADMIN — BUPRENORPHINE HYDROCHLORIDE 0.3 MG: 0.32 INJECTION INTRAMUSCULAR; INTRAVENOUS at 09:57

## 2024-02-02 RX ADMIN — SODIUM CHLORIDE, POTASSIUM CHLORIDE, SODIUM LACTATE AND CALCIUM CHLORIDE 125 ML/HR: 600; 310; 30; 20 INJECTION, SOLUTION INTRAVENOUS at 09:04

## 2024-02-02 RX ADMIN — OXYCODONE AND ACETAMINOPHEN 1 TABLET: 5; 325 TABLET ORAL at 11:20

## 2024-02-02 RX ADMIN — ROPIVACAINE HYDROCHLORIDE 250 MG: 5 INJECTION, SOLUTION EPIDURAL; INFILTRATION; PERINEURAL at 09:57

## 2024-02-02 RX ADMIN — FENTANYL CITRATE 50 MCG: 50 INJECTION, SOLUTION INTRAMUSCULAR; INTRAVENOUS at 11:04

## 2024-02-02 RX ADMIN — DEXAMETHASONE SODIUM PHOSPHATE 8 MG: 4 INJECTION, SOLUTION INTRA-ARTICULAR; INTRALESIONAL; INTRAMUSCULAR; INTRAVENOUS; SOFT TISSUE at 09:57

## 2024-02-02 NOTE — OP NOTE
CHOLECYSTECTOMY LAPAROSCOPIC WITH DAVINCI ROBOT  Procedure Note    Pricila Brown  2/2/2024    Pre-op Diagnosis:   Biliary dyskinesia [K82.8]    Post-op Diagnosis:     Post-Op Diagnosis Codes:     * Biliary dyskinesia [K82.8]    Procedure(s):  CHOLECYSTECTOMY LAPAROSCOPIC WITH DAVINCI ROBOT    Surgeon(s):  Domo Mckeon MD    Anesthesia: General    Staff:   Circulator: Jolly Mtz RN  Scrub Person: Konrad Junior  Assistant: Lucía Molina; Paige Garza    Findings: Distended gallbladder, critical view of safety obtained    Operative Procedure: The patient was taken to the operating suite and placed supine on operating table.  Bilateral sequential compression devices were applied and general endotracheal anesthesia administered.  The abdomen was prepped and draped in usual sterile fashion.  Preoperative antibiotics were confirmed.  Timeout procedure was performed.  A Veress needle was inserted at Mcgee's point and saline drop test confirmed entry into the peritoneal space.  Pneumoperitoneum was established.  An 8 mm Optiview trocar was inserted through an infraumbilical incision.  The laparoscope was inserted and the Veress needle site was free of injury.  The Veress needle site was removed and upsized to an 8 mm robotic trocar.  A second 8 mm robotic trocar was placed in the anterior axillary line at the level of the umbilicus of the right abdomen.  A 5 mm Optiview trocar was then placed as far lateral as possible in the right abdomen for an assistant trocar.  At this time the robot was docked to the trocars and the robotic camera inserted.  The patient had been placed in reverse Trendelenburg with left lateral tilt prior to docking.  I then exited the sterile field and entered the robotic console.  My assistant placed a robotic hook in the right arm #1 and a fenestrated bipolar in the left arm #2.  My assistant grasped the fundus of the gallbladder and retracted anteriorly and  superiorly.  The gallbladder was distended.  Mild adhesions to the fundus and infundibulum were taken down with cautery hook.  The infundibulum was exposed and grasped and retracted laterally and inferiorly.  The peritoneum on the anterior posterior surface of the gallbladder was opened with the cautery hook.  The gallbladder was elevated from the gallbladder fossa for a portion and the cystic duct and artery were identified and dissected free circumferentially.  All adventitial tissue between the cystic duct and artery was dissected free with the cautery hook.  The cystic plate was visible from the anterior and posterior views with only the cystic duct and artery seen entering the gallbladder.  With the critical view of safety obtained Firefly confirmed no abnormal ductal abnormality and at this time the cystic artery was controlled with a single Hemoclip placed proximally on the artery and the cystic duct controlled with 2 hemoclips placed on the cystic duct stump side and one on the infundibular side of the duct. The artery was divided with the cautery hook with no evidence of bleeding.  The cystic duct was divided with the cut mode of the cautery hook with no evidence of cystic duct stump leak.  The gallbladder was then removed from the gallbladder fossa intact.  This was done with the cautery hook.  Firefly was used to confirm no evidence of duct of Luschka or accessory duct leakage.  There was no cystic duct stump leakage.  At this time robotic instruments were removed under direct visualization.  The robot was undocked and I entered the sterile field for completion of the case.  A 5 mm laparoscope was inserted through a robotic trocar and the gallbladder was placed in a 5 mm Endo Catch bag. It was removed through the infraumbilical fascial defect.  The gallbladder fossa was hemostatic and at this time all trocars were removed under direct visualization and pneumoperitoneum was evacuated.  The infraumbilical  fascial defect was closed with a figure-of-eight Vicryl suture and all skin incisions closed with Monocryl subcuticular suture and dressed with Skin Affix.  The patient tolerated the procedure well.    Estimated Blood Loss: 5 mL    Specimens:   Gallbladder           Drains: None    Grafts/Implants: None     Complications: None      Domo Mckeon MD     Date: 2/2/2024  Time: 10:36 EST

## 2024-02-02 NOTE — ANESTHESIA PREPROCEDURE EVALUATION
Anesthesia Evaluation     no history of anesthetic complications:   NPO Solid Status: > 8 hours  NPO Liquid Status: > 8 hours           Airway   Mallampati: II  TM distance: >3 FB  Neck ROM: full  No difficulty expected  Dental - normal exam     Pulmonary - normal exam   (+) asthma,  Cardiovascular - normal exam        Neuro/Psych  (+) headaches, psychiatric history Depression  GI/Hepatic/Renal/Endo    (+) GERD    Musculoskeletal     Abdominal  - normal exam    Bowel sounds: normal.   Substance History      OB/GYN          Other                    Anesthesia Plan    ASA 2     general with block     intravenous induction     Anesthetic plan, risks, benefits, and alternatives have been provided, discussed and informed consent has been obtained with: patient.    CODE STATUS:

## 2024-02-02 NOTE — ANESTHESIA PROCEDURE NOTES
Airway  Urgency: elective    Date/Time: 2/2/2024 9:53 AM  Airway not difficult    General Information and Staff    Patient location during procedure: OR    Indications and Patient Condition  Indications for airway management: airway protection    Preoxygenated: yes  Mask difficulty assessment: 1 - vent by mask    Final Airway Details  Final airway type: endotracheal airway      Successful airway: ETT  Cuffed: yes   Successful intubation technique: direct laryngoscopy  Endotracheal tube insertion site: oral  Blade: Rosa  Blade size: 3  ETT size (mm): 7.0  Cormack-Lehane Classification: grade I - full view of glottis  Placement verified by: chest auscultation, capnometry and palpation of cuff   Measured from: lips  ETT/EBT  to lips (cm): 21  Number of attempts at approach: 1  Assessment: lips, teeth, and gum same as pre-op and atraumatic intubation

## 2024-02-02 NOTE — ANESTHESIA PROCEDURE NOTES
"Peripheral Block      Patient reassessed immediately prior to procedure    Patient location during procedure: OR  Start time: 2/2/2024 9:57 AM  Stop time: 2/2/2024 9:59 AM  Reason for block: at surgeon's request and post-op pain management  Performed by  NICOLE/CAA: Rosalia Gonzalez CRNA  Preanesthetic Checklist  Completed: patient identified, IV checked, site marked, risks and benefits discussed, surgical consent, monitors and equipment checked, pre-op evaluation and timeout performed  Prep:  Pt Position: supine  Sterile barriers:cap, gloves, sterile barriers and mask  Prep: ChloraPrep  Patient monitoring: blood pressure monitoring, continuous pulse oximetry and EKG  Procedure    Nursing cardiac assessment comments yes: Sedation, GA, Spinal,Epidural   Performed under: general  Guidance:ultrasound guided    ULTRASOUND INTERPRETATION.  Using ultrasound guidance a 20 G (20g 4\" Stimuplex) gauge needle was placed in close proximity to the nerve, at which point, under ultrasound guidance anesthetic was injected in the area of the nerve and spread of the anesthesia was seen on ultrasound in close proximity thereto.  There were no abnormalities seen on ultrasound; a digital image was taken; and the patient tolerated the procedure with no complications. Images:still images obtained    Laterality:Bilateral  Block Type:TAP  Injection Technique:single-shot  Needle Type:short-bevel  Needle Gauge:20 G  Resistance on Injection: none    Medications Used: ropivacaine (NAROPIN) injection 0.5 % - Peripheral Nerve   250 mg - 2/2/2024 9:57:00 AM  dexamethasone (DECADRON) injection - Injection   8 mg - 2/2/2024 9:57:00 AM  buprenorphine (BUPRENEX) injection - Injection   0.3 mg - 2/2/2024 9:57:00 AM      Medications  Preservative Free Saline:10ml  Comment:Block Injection:  Total volume divided equally between Right and Left block      Post Assessment  Injection Assessment: negative aspiration for heme, incremental injection and no " paresthesia on injection  Patient Tolerance:comfortable throughout block  Complications:no  Additional Notes  The pt was in the supine position under general anesthesia    Under Ultrasound guidance, a Ontiveros 4inch 360 degree needle was advanced with Normal Saline hydro dissection of tissue.  The Rectus and Transversus Abdominus muscles where visualized.  The needle tip was placed between the Transversus Abdominus and rectus abdominus, local anesthetic spread was visualized in the Transversus Abdominus Plane. Injection was made incrementally with aspiration every 5 mls.  There was no  intravascular injection,  injection pressure was normal, there was no neural injection, and the procedure was completed without difficulty. The same procedure was completed for left and right sided subcostal tap blocks. Thank You.

## 2024-02-02 NOTE — ANESTHESIA POSTPROCEDURE EVALUATION
Patient: Pricila Brown    Procedure Summary       Date: 02/02/24 Room / Location: [facility] OR 04 /  [facility] OR    Anesthesia Start: 0946 Anesthesia Stop: 1040    Procedure: CHOLECYSTECTOMY LAPAROSCOPIC WITH DAVINCI ROBOT (Abdomen) Diagnosis:       Biliary dyskinesia      (Biliary dyskinesia [K82.8])    Surgeons: Domo Mckeon MD Provider: William Nolan MD    Anesthesia Type: general with block ASA Status: 2            Anesthesia Type: general with block    Vitals  Vitals Value Taken Time   /68 02/02/24 1051   Temp 97.1 °F (36.2 °C) 02/02/24 1041   Pulse 55 02/02/24 1053   Resp 12 02/02/24 1041   SpO2 96 % 02/02/24 1053   Vitals shown include unfiled device data.        Post Anesthesia Care and Evaluation    Patient location during evaluation: PHASE II  Patient participation: complete - patient participated  Level of consciousness: awake and alert  Pain score: 1  Pain management: adequate    Airway patency: patent  Anesthetic complications: No anesthetic complications  PONV Status: controlled  Cardiovascular status: acceptable  Respiratory status: acceptable  Hydration status: acceptable

## 2024-02-06 LAB — REF LAB TEST METHOD: NORMAL

## 2024-02-21 ENCOUNTER — OFFICE VISIT (OUTPATIENT)
Dept: SURGERY | Facility: CLINIC | Age: 24
End: 2024-02-21
Payer: COMMERCIAL

## 2024-02-21 VITALS — BODY MASS INDEX: 35.34 KG/M2 | WEIGHT: 207 LBS | HEIGHT: 64 IN

## 2024-02-21 DIAGNOSIS — K82.8 BILIARY DYSKINESIA: Primary | ICD-10-CM

## 2024-02-21 PROCEDURE — 1159F MED LIST DOCD IN RCRD: CPT | Performed by: SURGERY

## 2024-02-21 PROCEDURE — 99024 POSTOP FOLLOW-UP VISIT: CPT | Performed by: SURGERY

## 2024-02-21 PROCEDURE — 1160F RVW MEDS BY RX/DR IN RCRD: CPT | Performed by: SURGERY

## 2024-02-21 NOTE — PROGRESS NOTES
Subjective   Pricila Brown is a 23 y.o. female  is here today for follow-up.         Pricila Brown is a 23 y.o. female here for followup after cholecystectomy.  The patient is doing well and tolerating diet.  Their incisions are healing well.  No complaints reported    Pathology consistent with benign appearing gallbladder with chronic inflammation    Physical Exam:  NAD, AOx3  RRR  CTAB  S/appropriately tender/incisions healing well          There are no diagnoses linked to this encounter.    Assessment     23 y.o. female s/p cholecystectomy secondary to biliary dyskinesia.  The patient is doing well and her incisions have healed without issue.  Follow-up as needed.

## 2024-02-27 ENCOUNTER — OFFICE VISIT (OUTPATIENT)
Dept: SURGERY | Facility: CLINIC | Age: 24
End: 2024-02-27
Payer: COMMERCIAL

## 2024-02-27 VITALS — BODY MASS INDEX: 35.34 KG/M2 | HEIGHT: 64 IN | WEIGHT: 207 LBS

## 2024-02-27 DIAGNOSIS — K82.8 BILIARY DYSKINESIA: Primary | ICD-10-CM

## 2024-02-27 PROCEDURE — 1159F MED LIST DOCD IN RCRD: CPT | Performed by: SURGERY

## 2024-02-27 PROCEDURE — 99024 POSTOP FOLLOW-UP VISIT: CPT | Performed by: SURGERY

## 2024-02-27 PROCEDURE — 1160F RVW MEDS BY RX/DR IN RCRD: CPT | Performed by: SURGERY

## 2024-02-27 NOTE — PROGRESS NOTES
Subjective   Pricila Brown is a 23 y.o. female  is here today for follow-up.         Pricila Brown is a 23 y.o. female here for follow up after cholecystectomy.  She was initially doing well in follow-up and has had some pain develop at her umbilical site and the underlying absorbable suture that was used to close the skin has evidence of the tail of the suture being extruded.  This has been removed in the office today.  No signs of infection or complication otherwise.    Physical Exam  Incisions well-healed, small tail of suture extruding from the umbilical incision site.            Assessment     Diagnoses and all orders for this visit:    1. Biliary dyskinesia (Primary)      Pricila Brown is a 23 y.o. female with pain at laparoscopic incision site at the umbilicus.  Extruded absorbable suture removed in the office today.  Follow-up as needed.

## 2024-10-21 ENCOUNTER — LAB (OUTPATIENT)
Dept: FAMILY MEDICINE CLINIC | Facility: CLINIC | Age: 24
End: 2024-10-21
Payer: OTHER GOVERNMENT

## 2024-10-21 ENCOUNTER — OFFICE VISIT (OUTPATIENT)
Dept: FAMILY MEDICINE CLINIC | Facility: CLINIC | Age: 24
End: 2024-10-21
Payer: OTHER GOVERNMENT

## 2024-10-21 VITALS
BODY MASS INDEX: 35.22 KG/M2 | WEIGHT: 211.4 LBS | HEIGHT: 65 IN | DIASTOLIC BLOOD PRESSURE: 82 MMHG | SYSTOLIC BLOOD PRESSURE: 116 MMHG | TEMPERATURE: 97.4 F | OXYGEN SATURATION: 98 % | HEART RATE: 100 BPM

## 2024-10-21 DIAGNOSIS — F41.1 GENERALIZED ANXIETY DISORDER: ICD-10-CM

## 2024-10-21 DIAGNOSIS — R53.83 OTHER FATIGUE: ICD-10-CM

## 2024-10-21 DIAGNOSIS — D50.9 IRON DEFICIENCY ANEMIA, UNSPECIFIED IRON DEFICIENCY ANEMIA TYPE: ICD-10-CM

## 2024-10-21 DIAGNOSIS — D50.9 IRON DEFICIENCY ANEMIA, UNSPECIFIED IRON DEFICIENCY ANEMIA TYPE: Primary | ICD-10-CM

## 2024-10-21 DIAGNOSIS — M25.649 MORNING JOINT STIFFNESS OF HAND, UNSPECIFIED LATERALITY: ICD-10-CM

## 2024-10-21 DIAGNOSIS — K52.9 CHRONIC DIARRHEA: ICD-10-CM

## 2024-10-21 DIAGNOSIS — G43.E19 INTRACTABLE CHRONIC MIGRAINE WITH AURA AND WITHOUT STATUS MIGRAINOSUS: ICD-10-CM

## 2024-10-21 PROCEDURE — 86235 NUCLEAR ANTIGEN ANTIBODY: CPT | Performed by: STUDENT IN AN ORGANIZED HEALTH CARE EDUCATION/TRAINING PROGRAM

## 2024-10-21 PROCEDURE — 86431 RHEUMATOID FACTOR QUANT: CPT | Performed by: STUDENT IN AN ORGANIZED HEALTH CARE EDUCATION/TRAINING PROGRAM

## 2024-10-21 PROCEDURE — 86225 DNA ANTIBODY NATIVE: CPT | Performed by: STUDENT IN AN ORGANIZED HEALTH CARE EDUCATION/TRAINING PROGRAM

## 2024-10-21 PROCEDURE — 82784 ASSAY IGA/IGD/IGG/IGM EACH: CPT | Performed by: STUDENT IN AN ORGANIZED HEALTH CARE EDUCATION/TRAINING PROGRAM

## 2024-10-21 PROCEDURE — 80053 COMPREHEN METABOLIC PANEL: CPT | Performed by: STUDENT IN AN ORGANIZED HEALTH CARE EDUCATION/TRAINING PROGRAM

## 2024-10-21 PROCEDURE — 85025 COMPLETE CBC W/AUTO DIFF WBC: CPT | Performed by: STUDENT IN AN ORGANIZED HEALTH CARE EDUCATION/TRAINING PROGRAM

## 2024-10-21 PROCEDURE — 86258 DGP ANTIBODY EACH IG CLASS: CPT | Performed by: STUDENT IN AN ORGANIZED HEALTH CARE EDUCATION/TRAINING PROGRAM

## 2024-10-21 PROCEDURE — 86200 CCP ANTIBODY: CPT | Performed by: STUDENT IN AN ORGANIZED HEALTH CARE EDUCATION/TRAINING PROGRAM

## 2024-10-21 PROCEDURE — 83540 ASSAY OF IRON: CPT | Performed by: STUDENT IN AN ORGANIZED HEALTH CARE EDUCATION/TRAINING PROGRAM

## 2024-10-21 PROCEDURE — 36415 COLL VENOUS BLD VENIPUNCTURE: CPT

## 2024-10-21 PROCEDURE — 83520 IMMUNOASSAY QUANT NOS NONAB: CPT | Performed by: STUDENT IN AN ORGANIZED HEALTH CARE EDUCATION/TRAINING PROGRAM

## 2024-10-21 PROCEDURE — 86140 C-REACTIVE PROTEIN: CPT | Performed by: STUDENT IN AN ORGANIZED HEALTH CARE EDUCATION/TRAINING PROGRAM

## 2024-10-21 PROCEDURE — 86364 TISS TRNSGLTMNASE EA IG CLAS: CPT | Performed by: STUDENT IN AN ORGANIZED HEALTH CARE EDUCATION/TRAINING PROGRAM

## 2024-10-21 PROCEDURE — 84466 ASSAY OF TRANSFERRIN: CPT | Performed by: STUDENT IN AN ORGANIZED HEALTH CARE EDUCATION/TRAINING PROGRAM

## 2024-10-21 PROCEDURE — 84443 ASSAY THYROID STIM HORMONE: CPT | Performed by: STUDENT IN AN ORGANIZED HEALTH CARE EDUCATION/TRAINING PROGRAM

## 2024-10-21 PROCEDURE — 99204 OFFICE O/P NEW MOD 45 MIN: CPT | Performed by: STUDENT IN AN ORGANIZED HEALTH CARE EDUCATION/TRAINING PROGRAM

## 2024-10-21 PROCEDURE — 86038 ANTINUCLEAR ANTIBODIES: CPT | Performed by: STUDENT IN AN ORGANIZED HEALTH CARE EDUCATION/TRAINING PROGRAM

## 2024-10-21 RX ORDER — ESCITALOPRAM OXALATE 10 MG/1
10 TABLET ORAL DAILY
Qty: 30 TABLET | Refills: 2 | Status: SHIPPED | OUTPATIENT
Start: 2024-10-21

## 2024-10-21 RX ORDER — FAMOTIDINE 40 MG/1
40 TABLET, FILM COATED ORAL DAILY
COMMUNITY

## 2024-10-21 RX ORDER — RIMEGEPANT SULFATE 75 MG/75MG
1 TABLET, ORALLY DISINTEGRATING ORAL DAILY PRN
Qty: 9 TABLET | Refills: 0 | Status: SHIPPED | OUTPATIENT
Start: 2024-10-21

## 2024-10-21 RX ORDER — FERROUS SULFATE 325(65) MG
325 TABLET ORAL
COMMUNITY

## 2024-10-22 ENCOUNTER — TELEPHONE (OUTPATIENT)
Dept: FAMILY MEDICINE CLINIC | Facility: CLINIC | Age: 24
End: 2024-10-22
Payer: OTHER GOVERNMENT

## 2024-10-22 LAB
ALBUMIN SERPL-MCNC: 4.3 G/DL (ref 3.5–5.2)
ALBUMIN/GLOB SERPL: 1.3 G/DL
ALP SERPL-CCNC: 71 U/L (ref 39–117)
ALT SERPL W P-5'-P-CCNC: 16 U/L (ref 1–33)
ANION GAP SERPL CALCULATED.3IONS-SCNC: 11.3 MMOL/L (ref 5–15)
AST SERPL-CCNC: 14 U/L (ref 1–32)
BASOPHILS # BLD AUTO: 0.09 10*3/MM3 (ref 0–0.2)
BASOPHILS NFR BLD AUTO: 0.7 % (ref 0–1.5)
BILIRUB SERPL-MCNC: <0.2 MG/DL (ref 0–1.2)
BUN SERPL-MCNC: 13 MG/DL (ref 6–20)
BUN/CREAT SERPL: 12.1 (ref 7–25)
CALCIUM SPEC-SCNC: 9.7 MG/DL (ref 8.6–10.5)
CHLORIDE SERPL-SCNC: 99 MMOL/L (ref 98–107)
CHROMATIN AB SERPL-ACNC: 10 IU/ML (ref 0–14)
CO2 SERPL-SCNC: 23.7 MMOL/L (ref 22–29)
CREAT SERPL-MCNC: 1.07 MG/DL (ref 0.57–1)
CRP SERPL-MCNC: 0.56 MG/DL (ref 0–0.5)
DEPRECATED RDW RBC AUTO: 38.5 FL (ref 37–54)
EGFRCR SERPLBLD CKD-EPI 2021: 74.1 ML/MIN/1.73
EOSINOPHIL # BLD AUTO: 0.17 10*3/MM3 (ref 0–0.4)
EOSINOPHIL NFR BLD AUTO: 1.3 % (ref 0.3–6.2)
ERYTHROCYTE [DISTWIDTH] IN BLOOD BY AUTOMATED COUNT: 12.8 % (ref 12.3–15.4)
GLOBULIN UR ELPH-MCNC: 3.4 GM/DL
GLUCOSE SERPL-MCNC: 80 MG/DL (ref 65–99)
HCT VFR BLD AUTO: 42.3 % (ref 34–46.6)
HGB BLD-MCNC: 13.5 G/DL (ref 12–15.9)
IMM GRANULOCYTES # BLD AUTO: 0.09 10*3/MM3 (ref 0–0.05)
IMM GRANULOCYTES NFR BLD AUTO: 0.7 % (ref 0–0.5)
IRON 24H UR-MRATE: 46 MCG/DL (ref 37–145)
IRON SATN MFR SERPL: 12 % (ref 20–50)
LYMPHOCYTES # BLD AUTO: 4.13 10*3/MM3 (ref 0.7–3.1)
LYMPHOCYTES NFR BLD AUTO: 31.2 % (ref 19.6–45.3)
MCH RBC QN AUTO: 26.5 PG (ref 26.6–33)
MCHC RBC AUTO-ENTMCNC: 31.9 G/DL (ref 31.5–35.7)
MCV RBC AUTO: 83.1 FL (ref 79–97)
MONOCYTES # BLD AUTO: 0.57 10*3/MM3 (ref 0.1–0.9)
MONOCYTES NFR BLD AUTO: 4.3 % (ref 5–12)
NEUTROPHILS NFR BLD AUTO: 61.8 % (ref 42.7–76)
NEUTROPHILS NFR BLD AUTO: 8.19 10*3/MM3 (ref 1.7–7)
NRBC BLD AUTO-RTO: 0 /100 WBC (ref 0–0.2)
PLATELET # BLD AUTO: 408 10*3/MM3 (ref 140–450)
PMV BLD AUTO: 11.5 FL (ref 6–12)
POTASSIUM SERPL-SCNC: 4.5 MMOL/L (ref 3.5–5.2)
PROT SERPL-MCNC: 7.7 G/DL (ref 6–8.5)
RBC # BLD AUTO: 5.09 10*6/MM3 (ref 3.77–5.28)
SODIUM SERPL-SCNC: 134 MMOL/L (ref 136–145)
TIBC SERPL-MCNC: 381 MCG/DL (ref 298–536)
TRANSFERRIN SERPL-MCNC: 256 MG/DL (ref 200–360)
TSH SERPL DL<=0.05 MIU/L-ACNC: 1.63 UIU/ML (ref 0.27–4.2)
WBC NRBC COR # BLD AUTO: 13.24 10*3/MM3 (ref 3.4–10.8)

## 2024-10-22 NOTE — TELEPHONE ENCOUNTER
Patient notified & verbalized understanding.   Speech Therapy    Patient not seen in therapy.    Treatment on hold due to nursing request and medical condition (on precedex infusion; unable to meaningfully participate).      Re-attempt plan: per established plan of care    OBJECTIVE                       Documented in the chart in the following areas:    Assessment.        Therapy procedure time and total treatment time can be found documented on the Time Entry flowsheet

## 2024-10-22 NOTE — TELEPHONE ENCOUNTER
Caller: Pricila Brown    Relationship: Self    Best call back number: [phone]     What was the call regarding:   PATIENT WOULD LIKE A CALL BACK TO DISCUSS THE RESULTS FOR HIS RECENT BLOOD WORK!

## 2024-10-22 NOTE — TELEPHONE ENCOUNTER
----- Message from Julio C Morales sent at 10/22/2024  1:18 PM EDT -----  Please let the patient know that she is slightly iron deficient but her hemoglobin is normal.  She does have signs of inflammation in her body I am waiting on some further labs to come back prior to being able to give a diagnosis.  Nothing acutely concerning.

## 2024-10-22 NOTE — PROGRESS NOTES
Chief Complaint  Establish Care and Anxiety    HPI:   Anxiety       Pricila Willow Brown is a 24 y.o. female who presents today to [facility] for Establish Care and Anxiety. Patient presents to establish care. She has generalized anxiety that has been present since she had her child about 16 months ago. She was placed on prn buspar which she does not feel is effective. She also has a history of OPAL s/p birth, she is currently on iron tablets.     She reports headaches, multi site joint pain, fatigue, joint stiffness. She also reports intermittent fevers. She recently had a cholecystectomy.     Previous History:   Past Medical History:   Diagnosis Date    Anxiety     Asthma     Dysfunctional gallbladder     Fast heart beat     GERD (gastroesophageal reflux disease)     Hx of migraines     Nausea     Pain     RUQ      Past Surgical History:   Procedure Laterality Date    CHOLECYSTECTOMY N/A 2/2/2024    Procedure: CHOLECYSTECTOMY LAPAROSCOPIC WITH DAVINCI ROBOT;  Surgeon: Domo Mckeon MD;  Location: [facility];  Service: Robotics - DaVinci;  Laterality: N/A;    D & C WITH SUCTION N/A 11/29/2018    Procedure: DILATATION AND CURETTAGE WITH SUCTION;  Surgeon: Estela Gupta DO;  Location: [facility];  Service: Obstetrics/Gynecology      Social History     Socioeconomic History    Marital status:    Tobacco Use    Smoking status: Never    Smokeless tobacco: Never   Vaping Use    Vaping status: Never Used   Substance and Sexual Activity    Alcohol use: No    Drug use: No    Sexual activity: Yes     Partners: Male     Birth control/protection: None      Health Maintenance Due   Topic Date Due    HPV VACCINES (1 - 3-dose series) Never done    ANNUAL PHYSICAL  Never done    PAP SMEAR  Never done    COVID-19 Vaccine (3 - 2023-24 season) 09/01/2024        Current Medications:  Current Outpatient Medications   Medication Sig Dispense Refill    busPIRone (BUSPAR) 10 MG tablet Take  "1 tablet by mouth Daily As Needed.      famotidine (PEPCID) 40 MG tablet Take 1 tablet by mouth Daily.      ferrous sulfate 325 (65 FE) MG tablet Take 1 tablet by mouth Daily With Breakfast.      Rimegepant Sulfate (Nurtec) 75 MG tablet dispersible tablet Take 1 tablet by mouth Daily As Needed (migrain). 9 tablet 0    escitalopram (Lexapro) 10 MG tablet Take 1 tablet by mouth Daily. 30 tablet 2     No current facility-administered medications for this visit.       Allergies:   Allergies   Allergen Reactions    Bactrim [Sulfamethoxazole-Trimethoprim] Hives    Penicillins Rash       Vitals:   /82 (BP Location: Right arm, Patient Position: Sitting, Cuff Size: Adult)   Pulse 100   Temp 97.4 °F (36.3 °C) (Temporal)   Ht 165.1 cm (65\")   Wt 95.9 kg (211 lb 6.4 oz)   LMP 09/30/2024   SpO2 98%   BMI 35.18 kg/m²     Estimated body mass index is 35.18 kg/m² as calculated from the following:    Height as of this encounter: 165.1 cm (65\").    Weight as of this encounter: 95.9 kg (211 lb 6.4 oz).    Pricila Brown  reports that she has never smoked. She has never used smokeless tobacco.          Physical Exam:   Physical Exam  Constitutional:       Appearance: Normal appearance.   HENT:      Mouth/Throat:      Mouth: Mucous membranes are moist.   Cardiovascular:      Rate and Rhythm: Normal rate and regular rhythm.   Pulmonary:      Effort: Pulmonary effort is normal.      Breath sounds: Normal breath sounds. No wheezing or rhonchi.   Musculoskeletal:         General: Normal range of motion.   Skin:     General: Skin is warm and dry.   Neurological:      Mental Status: She is alert.   Psychiatric:         Mood and Affect: Mood normal.          Lab Results:   Lab on 10/21/2024   Component Date Value Ref Range Status    Glucose 10/21/2024 80  65 - 99 mg/dL Final    BUN 10/21/2024 13  6 - 20 mg/dL Final    Creatinine 10/21/2024 1.07 (H)  0.57 - 1.00 mg/dL Final    Sodium 10/21/2024 134 (L)  136 - 145 mmol/L Final "    Potassium 10/21/2024 4.5  3.5 - 5.2 mmol/L Final    Chloride 10/21/2024 99  98 - 107 mmol/L Final    CO2 10/21/2024 23.7  22.0 - 29.0 mmol/L Final    Calcium 10/21/2024 9.7  8.6 - 10.5 mg/dL Final    Total Protein 10/21/2024 7.7  6.0 - 8.5 g/dL Final    Albumin 10/21/2024 4.3  3.5 - 5.2 g/dL Final    ALT (SGPT) 10/21/2024 16  1 - 33 U/L Final    AST (SGOT) 10/21/2024 14  1 - 32 U/L Final    Alkaline Phosphatase 10/21/2024 71  39 - 117 U/L Final    Total Bilirubin 10/21/2024 <0.2  0.0 - 1.2 mg/dL Final    Globulin 10/21/2024 3.4  gm/dL Final    A/G Ratio 10/21/2024 1.3  g/dL Final    BUN/Creatinine Ratio 10/21/2024 12.1  7.0 - 25.0 Final    Anion Gap 10/21/2024 11.3  5.0 - 15.0 mmol/L Final    eGFR 10/21/2024 74.1  >60.0 mL/min/1.73 Final    Iron 10/21/2024 46  37 - 145 mcg/dL Final    Iron Saturation (TSAT) 10/21/2024 12 (L)  20 - 50 % Final    Transferrin 10/21/2024 256  200 - 360 mg/dL Final    TIBC 10/21/2024 381  298 - 536 mcg/dL Final    Rheumatoid Factor Quantitative 10/21/2024 10.0  0.0 - 14.0 IU/mL Final    TSH 10/21/2024 1.630  0.270 - 4.200 uIU/mL Final    C-Reactive Protein 10/21/2024 0.56 (H)  0.00 - 0.50 mg/dL Final    WBC 10/21/2024 13.24 (H)  3.40 - 10.80 10*3/mm3 Final    RBC 10/21/2024 5.09  3.77 - 5.28 10*6/mm3 Final    Hemoglobin 10/21/2024 13.5  12.0 - 15.9 g/dL Final    Hematocrit 10/21/2024 42.3  34.0 - 46.6 % Final    MCV 10/21/2024 83.1  79.0 - 97.0 fL Final    MCH 10/21/2024 26.5 (L)  26.6 - 33.0 pg Final    MCHC 10/21/2024 31.9  31.5 - 35.7 g/dL Final    RDW 10/21/2024 12.8  12.3 - 15.4 % Final    RDW-SD 10/21/2024 38.5  37.0 - 54.0 fl Final    MPV 10/21/2024 11.5  6.0 - 12.0 fL Final    Platelets 10/21/2024 408  140 - 450 10*3/mm3 Final    Neutrophil % 10/21/2024 61.8  42.7 - 76.0 % Final    Lymphocyte % 10/21/2024 31.2  19.6 - 45.3 % Final    Monocyte % 10/21/2024 4.3 (L)  5.0 - 12.0 % Final    Eosinophil % 10/21/2024 1.3  0.3 - 6.2 % Final    Basophil % 10/21/2024 0.7  0.0 - 1.5 %  Final    Immature Grans % 10/21/2024 0.7 (H)  0.0 - 0.5 % Final    Neutrophils, Absolute 10/21/2024 8.19 (H)  1.70 - 7.00 10*3/mm3 Final    Lymphocytes, Absolute 10/21/2024 4.13 (H)  0.70 - 3.10 10*3/mm3 Final    Monocytes, Absolute 10/21/2024 0.57  0.10 - 0.90 10*3/mm3 Final    Eosinophils, Absolute 10/21/2024 0.17  0.00 - 0.40 10*3/mm3 Final    Basophils, Absolute 10/21/2024 0.09  0.00 - 0.20 10*3/mm3 Final    Immature Grans, Absolute 10/21/2024 0.09 (H)  0.00 - 0.05 10*3/mm3 Final    nRBC 10/21/2024 0.0  0.0 - 0.2 /100 WBC Final       Assessment and Plan  Diagnoses and all orders for this visit:    1. Iron deficiency anemia, unspecified iron deficiency anemia type (Primary)  -     CBC w AUTO Differential; Future  -     Comprehensive metabolic panel; Future  -     Iron and TIBC; Future    2. Other fatigue  -     TSH Rfx On Abnormal To Free T4; Future  -     SELWYN by IFA, Reflex 9-biomarkers profile; Future    3. Intractable chronic migraine with aura and without status migrainosus  -     Rimegepant Sulfate (Nurtec) 75 MG tablet dispersible tablet; Take 1 tablet by mouth Daily As Needed (migrain).  Dispense: 9 tablet; Refill: 0    4. Morning joint stiffness of hand, unspecified laterality  -     14.3.3 ETA, Rheum. Arthritis; Future  -     Cyclic Citrul Peptide Antibody, IgG / IgA; Future  -     Rheumatoid Factor; Future  -     SELWYN by IFA, Reflex 9-biomarkers profile; Future  -     C-reactive protein; Future    5. Chronic diarrhea  -     Celiac Ab tTG DGP TIgA; Future    6. Generalized anxiety disorder  -     escitalopram (Lexapro) 10 MG tablet; Take 1 tablet by mouth Daily.  Dispense: 30 tablet; Refill: 2    Recheck iron panel,. CBC/CMP. Continue ferrous sulfate  Patient has widespreadfatigue and joint pain. Family history of autoimmune disorders will check SELWYN with reflec.   Patient has recurrent migraines 1-2/month./ She has failed maxalt, and imitrex. Nurtec is effective for prevention. Will continue nurtec.    Patient has MCP swelling, and morning stiffness concerning for RA, will check CRP, CCP, RF, and 14-3-3 ETA.   Patient reports intermittent diarrhea and constipation. Given OPAL, and chronicdiarrhea will rule out celiac disease.   Patient has SHAI, will start lexapro, discussed risks beenfits and side effects.             New Medications:   New Medications Ordered This Visit   Medications    Rimegepant Sulfate (Nurtec) 75 MG tablet dispersible tablet     Sig: Take 1 tablet by mouth Daily As Needed (migrain).     Dispense:  9 tablet     Refill:  0    escitalopram (Lexapro) 10 MG tablet     Sig: Take 1 tablet by mouth Daily.     Dispense:  30 tablet     Refill:  2       Discontinued Medications:   Medications Discontinued During This Encounter   Medication Reason    omeprazole (priLOSEC) 20 MG capsule *Therapy completed    ibuprofen (ADVIL,MOTRIN) 600 MG tablet *Therapy completed    albuterol sulfate  (90 Base) MCG/ACT inhaler *Therapy completed    acetaminophen (TYLENOL) 325 MG tablet     Rimegepant Sulfate (Nurtec) 75 MG tablet dispersible tablet *Therapy completed    traMADol (ULTRAM) 50 MG tablet *Therapy completed                 Follow Up:   Return in about 6 weeks (around 12/2/2024).    Patient was given instructions and counseling regarding her condition or for health maintenance advice. Please see specific information pulled into the AVS if appropriate.       This document has been electronically signed by Julio C Morales DO   October 22, 2024 08:32 EDT    Dictated Utilizing Dragon Dictation: Part of this note may be an electronic transcription/translation of spoken language to printed text using the Dragon Dictation System.

## 2024-10-23 LAB
CCP IGA+IGG SERPL IA-ACNC: 5 UNITS (ref 0–19)
GLIADIN PEPTIDE IGA SER-ACNC: 3 UNITS (ref 0–19)
GLIADIN PEPTIDE IGG SER-ACNC: 2 UNITS (ref 0–19)
IGA SERPL-MCNC: 118 MG/DL (ref 87–352)
TTG IGA SER-ACNC: <2 U/ML (ref 0–3)
TTG IGG SER-ACNC: 4 U/ML (ref 0–5)

## 2024-10-25 LAB
ANA SER QL IF: POSITIVE
ANA SPECKLED TITR SER: NORMAL {TITER}
CENTROMERE B AB SER-ACNC: <0.2 AI (ref 0–0.9)
CHROMATIN AB SERPL-ACNC: <0.2 AI (ref 0–0.9)
DSDNA AB SER-ACNC: 4 IU/ML (ref 0–9)
ENA JO1 AB SER-ACNC: <0.2 AI (ref 0–0.9)
ENA RNP AB SER-ACNC: 0.3 AI (ref 0–0.9)
ENA SCL70 AB SER-ACNC: <0.2 AI (ref 0–0.9)
ENA SM AB SER-ACNC: <0.2 AI (ref 0–0.9)
ENA SS-A AB SER-ACNC: <0.2 AI (ref 0–0.9)
ENA SS-B AB SER-ACNC: <0.2 AI (ref 0–0.9)
LABORATORY COMMENT REPORT: ABNORMAL
Lab: NORMAL
Lab: NORMAL

## 2024-10-29 LAB — 14-3-3 ETA AG SER IA-MCNC: <0.2 NG/ML

## 2024-11-04 ENCOUNTER — TELEPHONE (OUTPATIENT)
Dept: FAMILY MEDICINE CLINIC | Facility: CLINIC | Age: 24
End: 2024-11-04
Payer: OTHER GOVERNMENT

## 2024-11-04 NOTE — TELEPHONE ENCOUNTER
Patient called stated that she has noticed after taking Lexapro for a few days that she is having a hard time swallowing,doesn't know if is related but.... not getting choking just feels like she cannot swallow,hard to go down,Please advise.

## 2024-11-05 ENCOUNTER — OFFICE VISIT (OUTPATIENT)
Dept: FAMILY MEDICINE CLINIC | Facility: CLINIC | Age: 24
End: 2024-11-05
Payer: OTHER GOVERNMENT

## 2024-11-05 VITALS
HEART RATE: 90 BPM | SYSTOLIC BLOOD PRESSURE: 118 MMHG | BODY MASS INDEX: 34.19 KG/M2 | WEIGHT: 205.2 LBS | TEMPERATURE: 97.3 F | OXYGEN SATURATION: 98 % | HEIGHT: 65 IN | DIASTOLIC BLOOD PRESSURE: 74 MMHG

## 2024-11-05 DIAGNOSIS — R13.19 ESOPHAGEAL DYSPHAGIA: Primary | ICD-10-CM

## 2024-11-05 PROCEDURE — 99213 OFFICE O/P EST LOW 20 MIN: CPT | Performed by: STUDENT IN AN ORGANIZED HEALTH CARE EDUCATION/TRAINING PROGRAM

## 2024-11-05 NOTE — PROGRESS NOTES
Chief Complaint  Difficulty Swallowing    HPI:   Difficulty Swallowing       Pricila Brown is a 24 y.o. female who presents today to [facility] for Difficulty Swallowing. Patient presents for dysphagia. She is concerned it could be her lexapro but she is also on iron. She denies shortness of breath, fever, chills or weight loss. She reports that it feels like her food gets stuck sometimes but resolves with drinking water. She denies rash or pruritus.     Previous History:   Past Medical History:   Diagnosis Date    Anxiety     Asthma     Dysfunctional gallbladder     Fast heart beat     GERD (gastroesophageal reflux disease)     Hx of migraines     Nausea     Pain     RUQ      Past Surgical History:   Procedure Laterality Date    CHOLECYSTECTOMY N/A 2/2/2024    Procedure: CHOLECYSTECTOMY LAPAROSCOPIC WITH DAVINCI ROBOT;  Surgeon: Domo Mckeon MD;  Location: [facility];  Service: Robotics - DaVinci;  Laterality: N/A;    D & C WITH SUCTION N/A 11/29/2018    Procedure: DILATATION AND CURETTAGE WITH SUCTION;  Surgeon: Estela Gupta DO;  Location: [facility];  Service: Obstetrics/Gynecology      Social History     Socioeconomic History    Marital status:    Tobacco Use    Smoking status: Never    Smokeless tobacco: Never   Vaping Use    Vaping status: Never Used   Substance and Sexual Activity    Alcohol use: No    Drug use: No    Sexual activity: Yes     Partners: Male     Birth control/protection: None      Health Maintenance Due   Topic Date Due    HPV VACCINES (1 - 3-dose series) Never done    ANNUAL PHYSICAL  Never done    PAP SMEAR  Never done    COVID-19 Vaccine (3 - 2024-25 season) 09/01/2024        Current Medications:  Current Outpatient Medications   Medication Sig Dispense Refill    escitalopram (Lexapro) 10 MG tablet Take 1 tablet by mouth Daily. 30 tablet 2    famotidine (PEPCID) 40 MG tablet Take 1 tablet by mouth Daily.      ferrous sulfate 325 (65  "FE) MG tablet Take 1 tablet by mouth Daily With Breakfast.      Rimegepant Sulfate (Nurtec) 75 MG tablet dispersible tablet Take 1 tablet by mouth Daily As Needed (migrain). 9 tablet 0     No current facility-administered medications for this visit.       Allergies:   Allergies   Allergen Reactions    Bactrim [Sulfamethoxazole-Trimethoprim] Hives    Penicillins Rash       Vitals:   /74 (BP Location: Right arm, Patient Position: Sitting)   Pulse 90   Temp 97.3 °F (36.3 °C) (Temporal)   Ht 165.1 cm (65\")   Wt 93.1 kg (205 lb 3.2 oz)   LMP 10/30/2024   SpO2 98%   BMI 34.15 kg/m²     Estimated body mass index is 34.15 kg/m² as calculated from the following:    Height as of this encounter: 165.1 cm (65\").    Weight as of this encounter: 93.1 kg (205 lb 3.2 oz).    Pricila Brown  reports that she has never smoked. She has never used smokeless tobacco.            Physical Exam:   Physical Exam  Constitutional:       Appearance: Normal appearance.   HENT:      Mouth/Throat:      Mouth: Mucous membranes are moist.   Cardiovascular:      Rate and Rhythm: Normal rate and regular rhythm.   Pulmonary:      Effort: Pulmonary effort is normal.      Breath sounds: Normal breath sounds. No wheezing or rhonchi.   Musculoskeletal:         General: Normal range of motion.   Skin:     General: Skin is warm and dry.   Neurological:      Mental Status: She is alert.   Psychiatric:         Mood and Affect: Mood normal.          Lab Results:   Lab on 10/21/2024   Component Date Value Ref Range Status    Glucose 10/21/2024 80  65 - 99 mg/dL Final    BUN 10/21/2024 13  6 - 20 mg/dL Final    Creatinine 10/21/2024 1.07 (H)  0.57 - 1.00 mg/dL Final    Sodium 10/21/2024 134 (L)  136 - 145 mmol/L Final    Potassium 10/21/2024 4.5  3.5 - 5.2 mmol/L Final    Chloride 10/21/2024 99  98 - 107 mmol/L Final    CO2 10/21/2024 23.7  22.0 - 29.0 mmol/L Final    Calcium 10/21/2024 9.7  8.6 - 10.5 mg/dL Final    Total Protein 10/21/2024 " 7.7  6.0 - 8.5 g/dL Final    Albumin 10/21/2024 4.3  3.5 - 5.2 g/dL Final    ALT (SGPT) 10/21/2024 16  1 - 33 U/L Final    AST (SGOT) 10/21/2024 14  1 - 32 U/L Final    Alkaline Phosphatase 10/21/2024 71  39 - 117 U/L Final    Total Bilirubin 10/21/2024 <0.2  0.0 - 1.2 mg/dL Final    Globulin 10/21/2024 3.4  gm/dL Final    A/G Ratio 10/21/2024 1.3  g/dL Final    BUN/Creatinine Ratio 10/21/2024 12.1  7.0 - 25.0 Final    Anion Gap 10/21/2024 11.3  5.0 - 15.0 mmol/L Final    eGFR 10/21/2024 74.1  >60.0 mL/min/1.73 Final    Iron 10/21/2024 46  37 - 145 mcg/dL Final    Iron Saturation (TSAT) 10/21/2024 12 (L)  20 - 50 % Final    Transferrin 10/21/2024 256  200 - 360 mg/dL Final    TIBC 10/21/2024 381  298 - 536 mcg/dL Final    14.3.3 ETA, Rheum. Arthritis 10/21/2024 <0.20  ng/mL Final    Comment: Reference Range: < 0.20  Comments:  14-3-3 eta protein is a joint-derived, proinflammatory   that is implicated in the joint erosion process  and pathogenesis of RA.  Serum 14-3-3 eta is elevated in  both early and established RA.  - Diagnostic value:    14-3-3 eta is highly specific for RA.    Serum 14-3-3 eta may be especially helpful in identifying    patients with early RA where it provides a 15% incremental    benefit to the diagnostic sensitivity of markers,    Rheumatoid Arthritis (RA) Factor and Cyclic Citrullinated    Peptide (CCP) Antibodies, i.e. An additional 15% of early    RA patients may be detected by 14-3-3 eta (1).  - Correlation with radiographic evidence of joint damage.    Positive serum 14-3-3 eta levels are associated with    higher rates of joint damage as measured by radiographic    assessments (Sharp/van isamar Heijde Score) (2).  - Serum 14-3-3 eta levels above a threshold of 0.50 ng/ml    identify RA patients who will have more rapid radiographic                               progression, even those who may be in SDAI remission (1).  References:  1. Eitan RAINES, et al. Serum levels of 14-3-3 eta  protein     supplement C-reactive protein and rheumatoid arthritis-     associated antibodies to predict clinical and     radiographic outcomes in a  prospective cohort of     patients with recent-onset inflammatory polyarthritis.     Arthritis Res Ther 2016;18:37  2. Shira FINNEY, et al. 14-3-3 eta is a novel      associated with the pathogenesis of rheumatoid arthritis     and joint damage. Arthritis Res Ther 2014;16:R99.  This test was developed and its performance characteristics  determined by [employer]. It has not been cleared or approved  by the Food and Drug Administration.    CCP Antibodies IgG/IgA 10/21/2024 5  0 - 19 units Final                              Negative               <20                            Weak positive      20 - 39                            Moderate positive  40 - 59                            Strong positive        >59    Rheumatoid Factor Quantitative 10/21/2024 10.0  0.0 - 14.0 IU/mL Final    TSH 10/21/2024 1.630  0.270 - 4.200 uIU/mL Final    SELWYN 10/21/2024 Positive (A)   Final                                         Negative   <1:80                                       Borderline  1:80                                       Positive   >1:80    Please note 10/21/2024 Comment   Final    SELWYN Multiplex methodology was designed to detect up to 11 antibodies  of the 100+ antibodies that may be detected by SELWYN IFA methodology.    C-Reactive Protein 10/21/2024 0.56 (H)  0.00 - 0.50 mg/dL Final    IgA 10/21/2024 118  87 - 352 mg/dL Final    Gliadin Deamidated Peptide Ab, IgA 10/21/2024 3  0 - 19 units Final                       Negative                   0 - 19                     Weak Positive             20 - 30                     Moderate to Strong Positive   >30    Deaminated Gliadin Ab IgG 10/21/2024 2  0 - 19 units Final                       Negative                   0 - 19                     Weak Positive             20 - 30                     Moderate to Strong  Positive   >30    Tissue Transglutaminase IgA 10/21/2024 <2  0 - 3 U/mL Final                                  Negative        0 -  3                                Weak Positive   4 - 10                                Positive           >10   Tissue Transglutaminase (tTG) has been identified   as the endomysial antigen.  Studies have demonstr-   ated that endomysial IgA antibodies have over 99%   specificity for gluten sensitive enteropathy.    Tissue Transglutaminase IgG 10/21/2024 4  0 - 5 U/mL Final                                  Negative        0 - 5                                Weak Positive   6 - 9                                Positive           >9    WBC 10/21/2024 13.24 (H)  3.40 - 10.80 10*3/mm3 Final    RBC 10/21/2024 5.09  3.77 - 5.28 10*6/mm3 Final    Hemoglobin 10/21/2024 13.5  12.0 - 15.9 g/dL Final    Hematocrit 10/21/2024 42.3  34.0 - 46.6 % Final    MCV 10/21/2024 83.1  79.0 - 97.0 fL Final    MCH 10/21/2024 26.5 (L)  26.6 - 33.0 pg Final    MCHC 10/21/2024 31.9  31.5 - 35.7 g/dL Final    RDW 10/21/2024 12.8  12.3 - 15.4 % Final    RDW-SD 10/21/2024 38.5  37.0 - 54.0 fl Final    MPV 10/21/2024 11.5  6.0 - 12.0 fL Final    Platelets 10/21/2024 408  140 - 450 10*3/mm3 Final    Neutrophil % 10/21/2024 61.8  42.7 - 76.0 % Final    Lymphocyte % 10/21/2024 31.2  19.6 - 45.3 % Final    Monocyte % 10/21/2024 4.3 (L)  5.0 - 12.0 % Final    Eosinophil % 10/21/2024 1.3  0.3 - 6.2 % Final    Basophil % 10/21/2024 0.7  0.0 - 1.5 % Final    Immature Grans % 10/21/2024 0.7 (H)  0.0 - 0.5 % Final    Neutrophils, Absolute 10/21/2024 8.19 (H)  1.70 - 7.00 10*3/mm3 Final    Lymphocytes, Absolute 10/21/2024 4.13 (H)  0.70 - 3.10 10*3/mm3 Final    Monocytes, Absolute 10/21/2024 0.57  0.10 - 0.90 10*3/mm3 Final    Eosinophils, Absolute 10/21/2024 0.17  0.00 - 0.40 10*3/mm3 Final    Basophils, Absolute 10/21/2024 0.09  0.00 - 0.20 10*3/mm3 Final    Immature Grans, Absolute 10/21/2024 0.09 (H)  0.00 - 0.05 10*3/mm3  Final    nRBC 10/21/2024 0.0  0.0 - 0.2 /100 WBC Final    Speckled Pattern 10/21/2024 1:80   Final    ICAP nomenclature: AC-2,4,5,29    Note: (Reference) 10/21/2024 Comment   Final    Comment: Pattern              Potential Disease Association  -------------  ---------------------------------------------  Homogeneous    Systemic Lupus Erythematosus, Drug Induced                 Systemic Lupus Erythematosus, Chronic                 Autoimmune hepatitis, Juvenile Idiopathic                 Arthritis  -------------  ---------------------------------------------  Speckled       Sjogren Syndrome, Systemic Lupus                 Erythematosus, Subacute Cutaneous Lupus,                  Lupus, Congenital Heart Block,                 Mixed Connective Tissue Disease,                 Scleroderma-diffuse, Scleroderma-Autoimmune                 Myositis Overlap Syndrome, Systemic Lupus                 Dryzpdloksctw-Kqqgvvpknvq-Fhbirvtamo                 Myositis Overlap Syndrome, Systemic                 Autoimmune Rheumatic Disease,                 Undifferentiated Connective Tissue Disease  -------------  ---------------------------------------------  Nucleolar      Systemic                            Sclerosis, Scleroderma-Autoimmune                 Myositis Overlap Syndrome, Sjogren                 Syndrome, Raynaud phenomenon, Pulmonary                 Arterial Hypertension, Systemic Autoimmune                 Rheumatic Disease, Cancer  -------------  ---------------------------------------------  Centromere     Scleroderma-CREST, Limited Cutaneous SSc,                 Raynaud's Phenomenon, Primary Biliary                 Cholangitis  -------------  ---------------------------------------------  Nuclear Dot    Primary Biliary Cholangitis  -------------  ---------------------------------------------  Nuclear        Primary Biliary Cholangitis, Autoimmune  Membrane       Hepatitis/Liver disease, Systemic Autoimmune                  Rheumatic Disease, Autoimmune Cytopenias,                 Linear Scleroderma, Antiphospholipid Syndrome  -------------  ---------------------------------------------    Anti-DNA (DS) Ab Qn 10/21/2024 4  0 - 9 IU/mL Final                                       Negative      <5                                     Equivocal  5 - 9                                     Positive      >9    RNP Antibodies 10/21/2024 0.3  0.0 - 0.9 AI Final    German Antibodies 10/21/2024 <0.2  0.0 - 0.9 AI Final    Antiscleroderma-70 Antibodies 10/21/2024 <0.2  0.0 - 0.9 AI Final    Sjogren's Anti-SS-A 10/21/2024 <0.2  0.0 - 0.9 AI Final    Sjogren's Anti-SS-B 10/21/2024 <0.2  0.0 - 0.9 AI Final    Antichromatin Antibodies 10/21/2024 <0.2  0.0 - 0.9 AI Final    CARLOS-1 IgG 10/21/2024 <0.2  0.0 - 0.9 AI Final    Anti-Centromere B Antibodies 10/21/2024 <0.2  0.0 - 0.9 AI Final    See below: 10/21/2024 Comment   Final    Comment: Autoantibody                       Disease Association  ------------------------------------------------------------                          Condition                  Frequency  ---------------------   ------------------------   ---------  Antinuclear Antibody,    SLE, mixed connective  Direct (SELWYN-D)           tissue diseases  ---------------------   ------------------------   ---------  dsDNA                    SLE                        40 - 60%  ---------------------   ------------------------   ---------  Chromatin                Drug induced SLE                90%                           SLE                        48 - 97%  ---------------------   ------------------------   ---------  SSA (Ro)                 SLE                        25 - 35%                           Sjogren's Syndrome         40 - 70%                            Lupus                 100%  ---------------------   ------------------------   ---------  SSB (La)                 SLE                                                         10%                           Sjogren's Syndrome              30%  ---------------------   -----------------------    ---------  Sm (anti-Smith)          SLE                        15 - 30%  ---------------------   -----------------------    ---------  RNP                      Mixed Connective Tissue                           Disease                         95%  (U1 nRNP,                SLE                        30 - 50%  anti-ribonucleoprotein)  Polymyositis and/or                           Dermatomyositis                 20%  ---------------------   ------------------------   ---------  Scl-70 (antiDNA          Scleroderma (diffuse)      20 - 35%  topoisomerase)           Crest                           13%  ---------------------   ------------------------   ---------  Keiko-1                     Polymyositis and/or                           Dermatomyositis            20 - 40%  ---------------------   ------------------------   ---------  Centromere B             Scleroderma -                            Crest                           variant                         80%       Assessment and Plan  Diagnoses and all orders for this visit:    1. Esophageal dysphagia (Primary)      I suspect the patients cause is actually iron supplementation. Her iron is only mildly low, I will hold iron supplements for one week and follow up. No red flag sings warranting imaging or EGD. I will follow up in 1 week and recheck.           New Medications:   No orders of the defined types were placed in this encounter.      Discontinued Medications:   Medications Discontinued During This Encounter   Medication Reason    busPIRone (BUSPAR) 10 MG tablet *Therapy completed                 Follow Up:   Return in about 1 week (around 11/12/2024).    Patient was given instructions and counseling regarding her condition or for health maintenance advice. Please see specific information pulled into the AVS if appropriate.       This  document has been electronically signed by Julio C Morales DO   November 5, 2024 11:24 EST    Dictated Utilizing Dragon Dictation: Part of this note may be an electronic transcription/translation of spoken language to printed text using the Dragon Dictation System.

## 2024-11-12 ENCOUNTER — OFFICE VISIT (OUTPATIENT)
Dept: FAMILY MEDICINE CLINIC | Facility: CLINIC | Age: 24
End: 2024-11-12
Payer: OTHER GOVERNMENT

## 2024-11-12 VITALS
OXYGEN SATURATION: 98 % | TEMPERATURE: 97.1 F | SYSTOLIC BLOOD PRESSURE: 106 MMHG | HEART RATE: 86 BPM | BODY MASS INDEX: 34.22 KG/M2 | DIASTOLIC BLOOD PRESSURE: 82 MMHG | WEIGHT: 205.4 LBS | HEIGHT: 65 IN

## 2024-11-12 DIAGNOSIS — R13.19 ESOPHAGEAL DYSPHAGIA: Primary | ICD-10-CM

## 2024-11-12 DIAGNOSIS — M25.649 MORNING JOINT STIFFNESS OF HAND, UNSPECIFIED LATERALITY: ICD-10-CM

## 2024-11-12 DIAGNOSIS — F41.1 GENERALIZED ANXIETY DISORDER: ICD-10-CM

## 2024-11-12 PROCEDURE — 99214 OFFICE O/P EST MOD 30 MIN: CPT | Performed by: STUDENT IN AN ORGANIZED HEALTH CARE EDUCATION/TRAINING PROGRAM

## 2024-11-12 RX ORDER — ESCITALOPRAM OXALATE 20 MG/1
20 TABLET ORAL DAILY
Qty: 30 TABLET | Refills: 2 | Status: SHIPPED | OUTPATIENT
Start: 2024-11-12

## 2024-11-12 NOTE — PROGRESS NOTES
Chief Complaint  Esophageal dysphagia (Follow up)    HPI:   HPI   Pricila Brown is a 24 y.o. female who presents today to [facility] for Esophageal dysphagia (Follow up).  Patient presents for follow-up, she had some esophageal dysphagia after starting Lexapro.  Unclear etiology no signs of allergic reaction, we stopped her iron supplements as thought this may be causing some dyspepsia but she reports no relief in symptoms.  She reports some mild acid reflux that is not improving with famotidine.    She also has multisite joint inflammation and intermittent fevers concerning for autoimmune disease.  Her SELWYN is mildly +1-80, RF is 10.0.  She showed me pictures that look like MCP swelling.  She reports morning stiffness as well.    Previous History:   Past Medical History:   Diagnosis Date    Anxiety     Asthma     Dysfunctional gallbladder     Fast heart beat     GERD (gastroesophageal reflux disease)     Hx of migraines     Nausea     Pain     RUQ      Past Surgical History:   Procedure Laterality Date    CHOLECYSTECTOMY N/A 2/2/2024    Procedure: CHOLECYSTECTOMY LAPAROSCOPIC WITH DAVINCI ROBOT;  Surgeon: Domo Mckeon MD;  Location: [facility];  Service: Robotics - DaVinci;  Laterality: N/A;    D & C WITH SUCTION N/A 11/29/2018    Procedure: DILATATION AND CURETTAGE WITH SUCTION;  Surgeon: Estela Gupta DO;  Location: [facility];  Service: Obstetrics/Gynecology      Social History     Socioeconomic History    Marital status:    Tobacco Use    Smoking status: Never    Smokeless tobacco: Never   Vaping Use    Vaping status: Never Used   Substance and Sexual Activity    Alcohol use: No    Drug use: No    Sexual activity: Yes     Partners: Male     Birth control/protection: None      Health Maintenance Due   Topic Date Due    HPV VACCINES (1 - 3-dose series) Never done    ANNUAL PHYSICAL  Never done    PAP SMEAR  Never done    COVID-19 Vaccine (3 - 2024-25  "season) 09/01/2024        Current Medications:  Current Outpatient Medications   Medication Sig Dispense Refill    escitalopram (Lexapro) 20 MG tablet Take 1 tablet by mouth Daily. 30 tablet 2    Rimegepant Sulfate (Nurtec) 75 MG tablet dispersible tablet Take 1 tablet by mouth Daily As Needed (migrain). 9 tablet 0    ferrous sulfate 325 (65 FE) MG tablet Take 1 tablet by mouth Daily With Breakfast. (Patient not taking: Reported on 11/12/2024)      omeprazole (priLOSEC) 20 MG capsule Take 1 capsule by mouth Daily. 30 capsule 2     No current facility-administered medications for this visit.       Allergies:   Allergies   Allergen Reactions    Bactrim [Sulfamethoxazole-Trimethoprim] Hives    Penicillins Rash       Vitals:   /82 (BP Location: Right arm, Patient Position: Sitting, Cuff Size: Adult)   Pulse 86   Temp 97.1 °F (36.2 °C) (Temporal)   Ht 165.1 cm (65\")   Wt 93.2 kg (205 lb 6.4 oz)   LMP 10/30/2024   SpO2 98%   BMI 34.18 kg/m²     Estimated body mass index is 34.18 kg/m² as calculated from the following:    Height as of this encounter: 165.1 cm (65\").    Weight as of this encounter: 93.2 kg (205 lb 6.4 oz).    Pricila Brown  reports that she has never smoked. She has never used smokeless tobacco.            Physical Exam:   Physical Exam  Constitutional:       Appearance: Normal appearance.   HENT:      Mouth/Throat:      Mouth: Mucous membranes are moist.   Cardiovascular:      Rate and Rhythm: Normal rate and regular rhythm.   Pulmonary:      Effort: Pulmonary effort is normal.      Breath sounds: Normal breath sounds. No wheezing or rhonchi.   Musculoskeletal:         General: Normal range of motion.   Skin:     General: Skin is warm and dry.   Neurological:      Mental Status: She is alert.   Psychiatric:         Mood and Affect: Mood normal.          Lab Results:   Lab on 10/21/2024   Component Date Value Ref Range Status    Glucose 10/21/2024 80  65 - 99 mg/dL Final    BUN 10/21/2024 " 13  6 - 20 mg/dL Final    Creatinine 10/21/2024 1.07 (H)  0.57 - 1.00 mg/dL Final    Sodium 10/21/2024 134 (L)  136 - 145 mmol/L Final    Potassium 10/21/2024 4.5  3.5 - 5.2 mmol/L Final    Chloride 10/21/2024 99  98 - 107 mmol/L Final    CO2 10/21/2024 23.7  22.0 - 29.0 mmol/L Final    Calcium 10/21/2024 9.7  8.6 - 10.5 mg/dL Final    Total Protein 10/21/2024 7.7  6.0 - 8.5 g/dL Final    Albumin 10/21/2024 4.3  3.5 - 5.2 g/dL Final    ALT (SGPT) 10/21/2024 16  1 - 33 U/L Final    AST (SGOT) 10/21/2024 14  1 - 32 U/L Final    Alkaline Phosphatase 10/21/2024 71  39 - 117 U/L Final    Total Bilirubin 10/21/2024 <0.2  0.0 - 1.2 mg/dL Final    Globulin 10/21/2024 3.4  gm/dL Final    A/G Ratio 10/21/2024 1.3  g/dL Final    BUN/Creatinine Ratio 10/21/2024 12.1  7.0 - 25.0 Final    Anion Gap 10/21/2024 11.3  5.0 - 15.0 mmol/L Final    eGFR 10/21/2024 74.1  >60.0 mL/min/1.73 Final    Iron 10/21/2024 46  37 - 145 mcg/dL Final    Iron Saturation (TSAT) 10/21/2024 12 (L)  20 - 50 % Final    Transferrin 10/21/2024 256  200 - 360 mg/dL Final    TIBC 10/21/2024 381  298 - 536 mcg/dL Final    14.3.3 ETA, Rheum. Arthritis 10/21/2024 <0.20  ng/mL Final    Comment: Reference Range: < 0.20  Comments:  14-3-3 eta protein is a joint-derived, proinflammatory   that is implicated in the joint erosion process  and pathogenesis of RA.  Serum 14-3-3 eta is elevated in  both early and established RA.  - Diagnostic value:    14-3-3 eta is highly specific for RA.    Serum 14-3-3 eta may be especially helpful in identifying    patients with early RA where it provides a 15% incremental    benefit to the diagnostic sensitivity of markers,    Rheumatoid Arthritis (RA) Factor and Cyclic Citrullinated    Peptide (CCP) Antibodies, i.e. An additional 15% of early    RA patients may be detected by 14-3-3 eta (1).  - Correlation with radiographic evidence of joint damage.    Positive serum 14-3-3 eta levels are associated with    higher rates of  joint damage as measured by radiographic    assessments (Sharp/van isamar Heijde Score) (2).  - Serum 14-3-3 eta levels above a threshold of 0.50 ng/ml    identify RA patients who will have more rapid radiographic                               progression, even those who may be in SDAI remission (1).  References:  1. Eitan N, et al. Serum levels of 14-3-3 eta protein     supplement C-reactive protein and rheumatoid arthritis-     associated antibodies to predict clinical and     radiographic outcomes in a  prospective cohort of     patients with recent-onset inflammatory polyarthritis.     Arthritis Res Ther 2016;18:37  2. Shira FINNEY, et al. 14-3-3 eta is a novel      associated with the pathogenesis of rheumatoid arthritis     and joint damage. Arthritis Res Ther 2014;16:R99.  This test was developed and its performance characteristics  determined by [employer]. It has not been cleared or approved  by the Food and Drug Administration.    CCP Antibodies IgG/IgA 10/21/2024 5  0 - 19 units Final                              Negative               <20                            Weak positive      20 - 39                            Moderate positive  40 - 59                            Strong positive        >59    Rheumatoid Factor Quantitative 10/21/2024 10.0  0.0 - 14.0 IU/mL Final    TSH 10/21/2024 1.630  0.270 - 4.200 uIU/mL Final    SELWYN 10/21/2024 Positive (A)   Final                                         Negative   <1:80                                       Borderline  1:80                                       Positive   >1:80    Please note 10/21/2024 Comment   Final    SELWYN Multiplex methodology was designed to detect up to 11 antibodies  of the 100+ antibodies that may be detected by SELWYN IFA methodology.    C-Reactive Protein 10/21/2024 0.56 (H)  0.00 - 0.50 mg/dL Final    IgA 10/21/2024 118  87 - 352 mg/dL Final    Gliadin Deamidated Peptide Ab, IgA 10/21/2024 3  0 - 19 units Final                        Negative                   0 - 19                     Weak Positive             20 - 30                     Moderate to Strong Positive   >30    Deaminated Gliadin Ab IgG 10/21/2024 2  0 - 19 units Final                       Negative                   0 - 19                     Weak Positive             20 - 30                     Moderate to Strong Positive   >30    Tissue Transglutaminase IgA 10/21/2024 <2  0 - 3 U/mL Final                                  Negative        0 -  3                                Weak Positive   4 - 10                                Positive           >10   Tissue Transglutaminase (tTG) has been identified   as the endomysial antigen.  Studies have demonstr-   ated that endomysial IgA antibodies have over 99%   specificity for gluten sensitive enteropathy.    Tissue Transglutaminase IgG 10/21/2024 4  0 - 5 U/mL Final                                  Negative        0 - 5                                Weak Positive   6 - 9                                Positive           >9    WBC 10/21/2024 13.24 (H)  3.40 - 10.80 10*3/mm3 Final    RBC 10/21/2024 5.09  3.77 - 5.28 10*6/mm3 Final    Hemoglobin 10/21/2024 13.5  12.0 - 15.9 g/dL Final    Hematocrit 10/21/2024 42.3  34.0 - 46.6 % Final    MCV 10/21/2024 83.1  79.0 - 97.0 fL Final    MCH 10/21/2024 26.5 (L)  26.6 - 33.0 pg Final    MCHC 10/21/2024 31.9  31.5 - 35.7 g/dL Final    RDW 10/21/2024 12.8  12.3 - 15.4 % Final    RDW-SD 10/21/2024 38.5  37.0 - 54.0 fl Final    MPV 10/21/2024 11.5  6.0 - 12.0 fL Final    Platelets 10/21/2024 408  140 - 450 10*3/mm3 Final    Neutrophil % 10/21/2024 61.8  42.7 - 76.0 % Final    Lymphocyte % 10/21/2024 31.2  19.6 - 45.3 % Final    Monocyte % 10/21/2024 4.3 (L)  5.0 - 12.0 % Final    Eosinophil % 10/21/2024 1.3  0.3 - 6.2 % Final    Basophil % 10/21/2024 0.7  0.0 - 1.5 % Final    Immature Grans % 10/21/2024 0.7 (H)  0.0 - 0.5 % Final    Neutrophils, Absolute 10/21/2024 8.19 (H)  1.70 - 7.00  10*3/mm3 Final    Lymphocytes, Absolute 10/21/2024 4.13 (H)  0.70 - 3.10 10*3/mm3 Final    Monocytes, Absolute 10/21/2024 0.57  0.10 - 0.90 10*3/mm3 Final    Eosinophils, Absolute 10/21/2024 0.17  0.00 - 0.40 10*3/mm3 Final    Basophils, Absolute 10/21/2024 0.09  0.00 - 0.20 10*3/mm3 Final    Immature Grans, Absolute 10/21/2024 0.09 (H)  0.00 - 0.05 10*3/mm3 Final    nRBC 10/21/2024 0.0  0.0 - 0.2 /100 WBC Final    Speckled Pattern 10/21/2024 1:80   Final    ICAP nomenclature: AC-2,4,5,29    Note: (Reference) 10/21/2024 Comment   Final    Comment: Pattern              Potential Disease Association  -------------  ---------------------------------------------  Homogeneous    Systemic Lupus Erythematosus, Drug Induced                 Systemic Lupus Erythematosus, Chronic                 Autoimmune hepatitis, Juvenile Idiopathic                 Arthritis  -------------  ---------------------------------------------  Speckled       Sjogren Syndrome, Systemic Lupus                 Erythematosus, Subacute Cutaneous Lupus,                  Lupus, Congenital Heart Block,                 Mixed Connective Tissue Disease,                 Scleroderma-diffuse, Scleroderma-Autoimmune                 Myositis Overlap Syndrome, Systemic Lupus                 [id number]                 Myositis Overlap Syndrome, Systemic                 Autoimmune Rheumatic Disease,                 Undifferentiated Connective Tissue Disease  -------------  ---------------------------------------------  Nucleolar      Systemic                            Sclerosis, Scleroderma-Autoimmune                 Myositis Overlap Syndrome, Sjogren                 Syndrome, Raynaud phenomenon, Pulmonary                 Arterial Hypertension, Systemic Autoimmune                 Rheumatic Disease, Cancer  -------------  ---------------------------------------------  Centromere     Scleroderma-CREST, Limited Cutaneous SSc,                  Raynaud's Phenomenon, Primary Biliary                 Cholangitis  -------------  ---------------------------------------------  Nuclear Dot    Primary Biliary Cholangitis  -------------  ---------------------------------------------  Nuclear        Primary Biliary Cholangitis, Autoimmune  Membrane       Hepatitis/Liver disease, Systemic Autoimmune                 Rheumatic Disease, Autoimmune Cytopenias,                 Linear Scleroderma, Antiphospholipid Syndrome  -------------  ---------------------------------------------    Anti-DNA (DS) Ab Qn 10/21/2024 4  0 - 9 IU/mL Final                                       Negative      <5                                     Equivocal  5 - 9                                     Positive      >9    RNP Antibodies 10/21/2024 0.3  0.0 - 0.9 AI Final    German Antibodies 10/21/2024 <0.2  0.0 - 0.9 AI Final    Antiscleroderma-70 Antibodies 10/21/2024 <0.2  0.0 - 0.9 AI Final    Sjogren's Anti-SS-A 10/21/2024 <0.2  0.0 - 0.9 AI Final    Sjogren's Anti-SS-B 10/21/2024 <0.2  0.0 - 0.9 AI Final    Antichromatin Antibodies 10/21/2024 <0.2  0.0 - 0.9 AI Final    CARLOS-1 IgG 10/21/2024 <0.2  0.0 - 0.9 AI Final    Anti-Centromere B Antibodies 10/21/2024 <0.2  0.0 - 0.9 AI Final    See below: 10/21/2024 Comment   Final    Comment: Autoantibody                       Disease Association  ------------------------------------------------------------                          Condition                  Frequency  ---------------------   ------------------------   ---------  Antinuclear Antibody,    SLE, mixed connective  Direct (SELWYN-D)           tissue diseases  ---------------------   ------------------------   ---------  dsDNA                    SLE                        40 - 60%  ---------------------   ------------------------   ---------  Chromatin                Drug induced SLE                90%                           SLE                        48 - 97%  ---------------------    ------------------------   ---------  SSA (Ro)                 SLE                        25 - 35%                           Sjogren's Syndrome         40 - 70%                            Lupus                 100%  ---------------------   ------------------------   ---------  SSB (La)                 SLE                                                        10%                           Sjogren's Syndrome              30%  ---------------------   -----------------------    ---------  Sm (anti-Smith)          SLE                        15 - 30%  ---------------------   -----------------------    ---------  RNP                      Mixed Connective Tissue                           Disease                         95%  (U1 nRNP,                SLE                        30 - 50%  anti-ribonucleoprotein)  Polymyositis and/or                           Dermatomyositis                 20%  ---------------------   ------------------------   ---------  Scl-70 (antiDNA          Scleroderma (diffuse)      20 - 35%  topoisomerase)           Crest                           13%  ---------------------   ------------------------   ---------  Keiko-1                     Polymyositis and/or                           Dermatomyositis            20 - 40%  ---------------------   ------------------------   ---------  Centromere B             Scleroderma -                            Crest                           variant                         80%       Assessment and Plan  Diagnoses and all orders for this visit:    1. Esophageal dysphagia (Primary)  -     omeprazole (priLOSEC) 20 MG capsule; Take 1 capsule by mouth Daily.  Dispense: 30 capsule; Refill: 2    2. Generalized anxiety disorder  -     escitalopram (Lexapro) 20 MG tablet; Take 1 tablet by mouth Daily.  Dispense: 30 tablet; Refill: 2    3. Morning joint stiffness of hand, unspecified laterality    Unclear etiology again unlikely to be any type of allergic reaction,  will try to treat with PPI for 6 to 8 weeks and follow-up.  If this does not improve symptoms will consider EGD.  Anxiety is improving, will increase Lexapro to 20 mg.  Follow-up in 6 weeks.  Patient has morning stiffness of her hands, intermittent fevers, and MCP swelling.  Her RF is 10.0, her C-reactive protein is mildly elevated, her SELWYN is 1-80.  She does appear to have a reactive leukocytosis as well.  I am still concerned of rheumatoid arthritis, will refer to rheum for consultation.            New Medications:   New Medications Ordered This Visit   Medications    omeprazole (priLOSEC) 20 MG capsule     Sig: Take 1 capsule by mouth Daily.     Dispense:  30 capsule     Refill:  2    escitalopram (Lexapro) 20 MG tablet     Sig: Take 1 tablet by mouth Daily.     Dispense:  30 tablet     Refill:  2       Discontinued Medications:   Medications Discontinued During This Encounter   Medication Reason    famotidine (PEPCID) 40 MG tablet *Therapy completed    escitalopram (Lexapro) 10 MG tablet Reorder                 Follow Up:   Return in about 6 weeks (around 12/24/2024).    Patient was given instructions and counseling regarding her condition or for health maintenance advice. Please see specific information pulled into the AVS if appropriate.       This document has been electronically signed by Julio C Morales DO   November 12, 2024 10:12 EST    Dictated Utilizing Dragon Dictation: Part of this note may be an electronic transcription/translation of spoken language to printed text using the Dragon Dictation System.

## 2024-12-05 ENCOUNTER — OFFICE VISIT (OUTPATIENT)
Dept: FAMILY MEDICINE CLINIC | Facility: CLINIC | Age: 24
End: 2024-12-05
Payer: COMMERCIAL

## 2024-12-05 VITALS
BODY MASS INDEX: 33.32 KG/M2 | OXYGEN SATURATION: 96 % | TEMPERATURE: 98.4 F | DIASTOLIC BLOOD PRESSURE: 78 MMHG | SYSTOLIC BLOOD PRESSURE: 118 MMHG | HEART RATE: 107 BPM | HEIGHT: 65 IN | WEIGHT: 200 LBS

## 2024-12-05 DIAGNOSIS — F41.1 GENERALIZED ANXIETY DISORDER: Primary | ICD-10-CM

## 2024-12-05 PROCEDURE — 99214 OFFICE O/P EST MOD 30 MIN: CPT | Performed by: FAMILY MEDICINE

## 2024-12-05 RX ORDER — SERTRALINE HYDROCHLORIDE 25 MG/1
25 TABLET, FILM COATED ORAL DAILY
Qty: 30 TABLET | Refills: 2 | Status: SHIPPED | OUTPATIENT
Start: 2024-12-05

## 2024-12-05 NOTE — PROGRESS NOTES
"Chief Complaint  Medication Reaction    Subjective          Pricila Willow Brown presents to [facility]  History of Present Illness    Patient states she has been taking Lexapro and it was increased 3 weeks ago since increase she has been having involuntary body shakes diarrhea and nausea.  States she also complains of a headache all which started when she increased the medication.    Agreeable to switch medication.  Review of Systems      Objective   Vital Signs:   /78 (BP Location: Right arm, Patient Position: Sitting, Cuff Size: Adult)   Pulse 107   Temp 98.4 °F (36.9 °C) (Temporal)   Ht 165.1 cm (65\")   Wt 90.7 kg (200 lb)   SpO2 96%   BMI 33.28 kg/m²     Physical Exam  Constitutional:       General: She is not in acute distress.     Appearance: Normal appearance. She is well-developed and well-groomed. She is not ill-appearing, toxic-appearing or diaphoretic.   HENT:      Head: Normocephalic.      Nose: Nose normal. No congestion or rhinorrhea.      Mouth/Throat:      Mouth: Mucous membranes are moist.      Pharynx: Oropharynx is clear. No oropharyngeal exudate or posterior oropharyngeal erythema.   Eyes:      General: Lids are normal.         Right eye: No discharge.         Left eye: No discharge.      Extraocular Movements: Extraocular movements intact.      Pupils: Pupils are equal, round, and reactive to light.   Neck:      Vascular: No carotid bruit.   Cardiovascular:      Rate and Rhythm: Normal rate and regular rhythm.      Pulses: Normal pulses.      Heart sounds: Normal heart sounds. No murmur heard.     No friction rub. No gallop.   Pulmonary:      Effort: Pulmonary effort is normal. No respiratory distress.      Breath sounds: Normal breath sounds. No stridor. No wheezing, rhonchi or rales.   Chest:      Chest wall: No tenderness.   Abdominal:      General: Bowel sounds are normal. There is no distension.      Palpations: Abdomen is soft. There is no " mass.      Tenderness: There is no abdominal tenderness. There is no right CVA tenderness, left CVA tenderness, guarding or rebound.      Hernia: No hernia is present.   Musculoskeletal:         General: No swelling or tenderness. Normal range of motion.      Cervical back: Normal range of motion and neck supple. No rigidity or tenderness.      Right lower leg: No edema.      Left lower leg: No edema.   Lymphadenopathy:      Cervical: No cervical adenopathy.   Skin:     General: Skin is warm.      Capillary Refill: Capillary refill takes less than 2 seconds.      Coloration: Skin is not jaundiced.      Findings: No bruising, erythema or rash.   Neurological:      General: No focal deficit present.      Mental Status: She is alert and oriented to person, place, and time.      Motor: Motor function is intact. No weakness.      Coordination: Coordination is intact.      Gait: Gait is intact. Gait normal.   Psychiatric:         Attention and Perception: Attention normal.         Mood and Affect: Mood normal.         Speech: Speech normal.         Behavior: Behavior normal.         Cognition and Memory: Cognition normal.         Judgment: Judgment normal.        Result Review :                 Assessment and Plan    Diagnoses and all orders for this visit:    1. Generalized anxiety disorder (Primary)  Comments:  Stop Lexapro and start Zoloft.  Orders:  -     sertraline (Zoloft) 25 MG tablet; Take 1 tablet by mouth Daily.  Dispense: 30 tablet; Refill: 2      Patient's Body mass index is 33.28 kg/m². indicating that she is obese (BMI >30). Obesity-related health conditions include the following: none. Obesity is unchanged. BMI is is above average; BMI management plan is completed. We discussed low calorie, low carb based diet program, portion control, and increasing exercise..    Follow Up   Return in about 2 weeks (around 12/19/2024).  Patient was given instructions and counseling regarding her condition or for health  maintenance advice. Please see specific information pulled into the AVS if appropriate.     This document has been electronically signed by OSBALDO Romero  December 5, 2024 16:30 EST

## 2024-12-10 ENCOUNTER — OFFICE VISIT (OUTPATIENT)
Dept: FAMILY MEDICINE CLINIC | Facility: CLINIC | Age: 24
End: 2024-12-10
Payer: COMMERCIAL

## 2024-12-10 ENCOUNTER — LAB (OUTPATIENT)
Dept: FAMILY MEDICINE CLINIC | Facility: CLINIC | Age: 24
End: 2024-12-10
Payer: COMMERCIAL

## 2024-12-10 VITALS
WEIGHT: 206.2 LBS | TEMPERATURE: 97.9 F | SYSTOLIC BLOOD PRESSURE: 116 MMHG | OXYGEN SATURATION: 99 % | HEIGHT: 65 IN | BODY MASS INDEX: 34.35 KG/M2 | DIASTOLIC BLOOD PRESSURE: 78 MMHG | HEART RATE: 95 BPM

## 2024-12-10 DIAGNOSIS — F41.1 GENERALIZED ANXIETY DISORDER: Primary | ICD-10-CM

## 2024-12-10 DIAGNOSIS — G52.2 VAGAL NERVE SENSITIVITY: ICD-10-CM

## 2024-12-10 DIAGNOSIS — D50.9 IRON DEFICIENCY ANEMIA, UNSPECIFIED IRON DEFICIENCY ANEMIA TYPE: ICD-10-CM

## 2024-12-10 DIAGNOSIS — N30.00 ACUTE CYSTITIS WITHOUT HEMATURIA: ICD-10-CM

## 2024-12-10 LAB
BILIRUB BLD-MCNC: NEGATIVE MG/DL
CLARITY, POC: CLEAR
COLOR UR: YELLOW
EXPIRATION DATE: ABNORMAL
GLUCOSE UR STRIP-MCNC: NEGATIVE MG/DL
KETONES UR QL: NEGATIVE
LEUKOCYTE EST, POC: NEGATIVE
Lab: ABNORMAL
NITRITE UR-MCNC: NEGATIVE MG/ML
PH UR: 6 [PH] (ref 5–8)
PROT UR STRIP-MCNC: ABNORMAL MG/DL
RBC # UR STRIP: NEGATIVE /UL
SP GR UR: 1.02 (ref 1–1.03)
UROBILINOGEN UR QL: NORMAL

## 2024-12-10 PROCEDURE — 81003 URINALYSIS AUTO W/O SCOPE: CPT | Performed by: STUDENT IN AN ORGANIZED HEALTH CARE EDUCATION/TRAINING PROGRAM

## 2024-12-10 PROCEDURE — 82728 ASSAY OF FERRITIN: CPT | Performed by: STUDENT IN AN ORGANIZED HEALTH CARE EDUCATION/TRAINING PROGRAM

## 2024-12-10 PROCEDURE — 84466 ASSAY OF TRANSFERRIN: CPT | Performed by: STUDENT IN AN ORGANIZED HEALTH CARE EDUCATION/TRAINING PROGRAM

## 2024-12-10 PROCEDURE — 99214 OFFICE O/P EST MOD 30 MIN: CPT | Performed by: STUDENT IN AN ORGANIZED HEALTH CARE EDUCATION/TRAINING PROGRAM

## 2024-12-10 PROCEDURE — 36415 COLL VENOUS BLD VENIPUNCTURE: CPT

## 2024-12-10 PROCEDURE — 83540 ASSAY OF IRON: CPT | Performed by: STUDENT IN AN ORGANIZED HEALTH CARE EDUCATION/TRAINING PROGRAM

## 2024-12-10 PROCEDURE — 85027 COMPLETE CBC AUTOMATED: CPT | Performed by: STUDENT IN AN ORGANIZED HEALTH CARE EDUCATION/TRAINING PROGRAM

## 2024-12-10 PROCEDURE — 80053 COMPREHEN METABOLIC PANEL: CPT | Performed by: STUDENT IN AN ORGANIZED HEALTH CARE EDUCATION/TRAINING PROGRAM

## 2024-12-10 RX ORDER — NITROFURANTOIN 25; 75 MG/1; MG/1
100 CAPSULE ORAL 2 TIMES DAILY
Qty: 14 CAPSULE | Refills: 0 | Status: SHIPPED | OUTPATIENT
Start: 2024-12-10

## 2024-12-10 NOTE — PROGRESS NOTES
Chief Complaint  Difficulty Urinating    HPI:   Difficulty Urinating        Pricila Brown is a 24 y.o. female who presents today to [facility] for Difficulty Urinating.  Patient seen in follow-up for generalized anxiety disorder.  She was started on citalopram and titrated up to 20 mg however she developed a sensitivity reports that she was unable to sleep and also reported 1 episode of nausea, vomiting, and diaphoresis that she was concerned was an allergic reaction.  She seen a nurse practitioner in this office and was started on Zoloft.  She is tolerating this well without any further symptoms.  She reports in the last 3 months she has had 3 episodes of diaphoresis presyncope, palpitations, nausea, and diarrhea.  She reports a history of this intermittently dating all the way back to 2017 in which she had a cardiac workup that was negative.    She reports dysuria that started 2 days ago, denies flank pain, denies fever.    Previous History:   Past Medical History:   Diagnosis Date    Anxiety     Asthma     Dysfunctional gallbladder     Fast heart beat     GERD (gastroesophageal reflux disease)     Hx of migraines     Nausea     Pain     RUQ      Past Surgical History:   Procedure Laterality Date    CHOLECYSTECTOMY N/A 2/2/2024    Procedure: CHOLECYSTECTOMY LAPAROSCOPIC WITH DAVINCI ROBOT;  Surgeon: Domo Mckeon MD;  Location: [facility];  Service: Robotics - DaVinci;  Laterality: N/A;    D & C WITH SUCTION N/A 11/29/2018    Procedure: DILATATION AND CURETTAGE WITH SUCTION;  Surgeon: Estela Gupta DO;  Location: [facility];  Service: Obstetrics/Gynecology      Social History     Socioeconomic History    Marital status:    Tobacco Use    Smoking status: Never    Smokeless tobacco: Never   Vaping Use    Vaping status: Never Used   Substance and Sexual Activity    Alcohol use: No    Drug use: No    Sexual activity: Yes     Partners: Male     Birth  "control/protection: None      Health Maintenance Due   Topic Date Due    HPV VACCINES (1 - 3-dose series) Never done    ANNUAL PHYSICAL  Never done    PAP SMEAR  Never done    COVID-19 Vaccine (3 - 2024-25 season) 09/01/2024        Current Medications:  Current Outpatient Medications   Medication Sig Dispense Refill    ferrous sulfate 325 (65 FE) MG tablet Take 1 tablet by mouth Daily With Breakfast.      omeprazole (priLOSEC) 20 MG capsule Take 1 capsule by mouth Daily. 30 capsule 2    Rimegepant Sulfate (Nurtec) 75 MG tablet dispersible tablet Take 1 tablet by mouth Daily As Needed (migrain). 9 tablet 0    sertraline (Zoloft) 25 MG tablet Take 1 tablet by mouth Daily. 30 tablet 2    nitrofurantoin, macrocrystal-monohydrate, (Macrobid) 100 MG capsule Take 1 capsule by mouth 2 (Two) Times a Day. 14 capsule 0     No current facility-administered medications for this visit.       Allergies:   Allergies   Allergen Reactions    Bactrim [Sulfamethoxazole-Trimethoprim] Hives    Penicillins Rash       Vitals:   /78 (BP Location: Right arm, Patient Position: Sitting)   Pulse 95   Temp 97.9 °F (36.6 °C)   Ht 165.1 cm (65\")   Wt 93.5 kg (206 lb 3.2 oz)   LMP 11/30/2024 (Exact Date)   SpO2 99%   BMI 34.31 kg/m²     Estimated body mass index is 34.31 kg/m² as calculated from the following:    Height as of this encounter: 165.1 cm (65\").    Weight as of this encounter: 93.5 kg (206 lb 3.2 oz).    Pricila Brown  reports that she has never smoked. She has never used smokeless tobacco.            Physical Exam:   Physical Exam  Constitutional:       Appearance: Normal appearance.   HENT:      Mouth/Throat:      Mouth: Mucous membranes are moist.   Cardiovascular:      Rate and Rhythm: Normal rate and regular rhythm.   Pulmonary:      Effort: Pulmonary effort is normal.      Breath sounds: Normal breath sounds. No wheezing or rhonchi.   Musculoskeletal:         General: Normal range of motion.   Skin:     General: " Skin is warm and dry.   Neurological:      Mental Status: She is alert.   Psychiatric:         Mood and Affect: Mood normal.          Lab Results:   Office Visit on 12/10/2024   Component Date Value Ref Range Status    Color 12/10/2024 Yellow  Yellow, Straw, Dark Yellow, [place] Final    Clarity, UA 12/10/2024 Clear  Clear Final    Specific Gravity  12/10/2024 1.025  1.005 - 1.030 Final    pH, Urine 12/10/2024 6.0  5.0 - 8.0 Final    Leukocytes 12/10/2024 Negative  Negative Final    Nitrite, UA 12/10/2024 Negative  Negative Final    Protein, POC 12/10/2024 Trace (A)  Negative mg/dL Final    Glucose, UA 12/10/2024 Negative  Negative mg/dL Final    Ketones, UA 12/10/2024 Negative  Negative Final    Urobilinogen, UA 12/10/2024 Normal  Normal, 0.2 E.U./dL Final    Bilirubin 12/10/2024 Negative  Negative Final    Blood, UA 12/10/2024 Negative  Negative Final    Lot Number 12/10/2024 [id number]   Final    Expiration Date 12/10/2024 1,142,025   Final   Lab on 10/21/2024   Component Date Value Ref Range Status    Glucose 10/21/2024 80  65 - 99 mg/dL Final    BUN 10/21/2024 13  6 - 20 mg/dL Final    Creatinine 10/21/2024 1.07 (H)  0.57 - 1.00 mg/dL Final    Sodium 10/21/2024 134 (L)  136 - 145 mmol/L Final    Potassium 10/21/2024 4.5  3.5 - 5.2 mmol/L Final    Chloride 10/21/2024 99  98 - 107 mmol/L Final    CO2 10/21/2024 23.7  22.0 - 29.0 mmol/L Final    Calcium 10/21/2024 9.7  8.6 - 10.5 mg/dL Final    Total Protein 10/21/2024 7.7  6.0 - 8.5 g/dL Final    Albumin 10/21/2024 4.3  3.5 - 5.2 g/dL Final    ALT (SGPT) 10/21/2024 16  1 - 33 U/L Final    AST (SGOT) 10/21/2024 14  1 - 32 U/L Final    Alkaline Phosphatase 10/21/2024 71  39 - 117 U/L Final    Total Bilirubin 10/21/2024 <0.2  0.0 - 1.2 mg/dL Final    Globulin 10/21/2024 3.4  gm/dL Final    A/G Ratio 10/21/2024 1.3  g/dL Final    BUN/Creatinine Ratio 10/21/2024 12.1  7.0 - 25.0 Final    Anion Gap 10/21/2024 11.3  5.0 - 15.0 mmol/L Final    eGFR 10/21/2024 74.1   >60.0 mL/min/1.73 Final    Iron 10/21/2024 46  37 - 145 mcg/dL Final    Iron Saturation (TSAT) 10/21/2024 12 (L)  20 - 50 % Final    Transferrin 10/21/2024 256  200 - 360 mg/dL Final    TIBC 10/21/2024 381  298 - 536 mcg/dL Final    14.3.3 ETA, Rheum. Arthritis 10/21/2024 <0.20  ng/mL Final    Comment: Reference Range: < 0.20  Comments:  14-3-3 eta protein is a joint-derived, proinflammatory   that is implicated in the joint erosion process  and pathogenesis of RA.  Serum 14-3-3 eta is elevated in  both early and established RA.  - Diagnostic value:    14-3-3 eta is highly specific for RA.    Serum 14-3-3 eta may be especially helpful in identifying    patients with early RA where it provides a 15% incremental    benefit to the diagnostic sensitivity of markers,    Rheumatoid Arthritis (RA) Factor and Cyclic Citrullinated    Peptide (CCP) Antibodies, i.e. An additional 15% of early    RA patients may be detected by 14-3-3 eta (1).  - Correlation with radiographic evidence of joint damage.    Positive serum 14-3-3 eta levels are associated with    higher rates of joint damage as measured by radiographic    assessments (Sharp/van isamar Heijde Score) (2).  - Serum 14-3-3 eta levels above a threshold of 0.50 ng/ml    identify RA patients who will have more rapid radiographic                               progression, even those who may be in SDAI remission (1).  References:  1. Eitan N, et al. Serum levels of 14-3-3 eta protein     supplement C-reactive protein and rheumatoid arthritis-     associated antibodies to predict clinical and     radiographic outcomes in a  prospective cohort of     patients with recent-onset inflammatory polyarthritis.     Arthritis Res Ther 2016;18:37  2. Shira FINNEY, et al. 14-3-3 eta is a novel      associated with the pathogenesis of rheumatoid arthritis     and joint damage. Arthritis Res Ther 2014;16:R99.  This test was developed and its performance  characteristics  determined by [facility]. It has not been cleared or approved  by the Food and Drug Administration.    CCP Antibodies IgG/IgA 10/21/2024 5  0 - 19 units Final                              Negative               <20                            Weak positive      20 - 39                            Moderate positive  40 - 59                            Strong positive        >59    Rheumatoid Factor Quantitative 10/21/2024 10.0  0.0 - 14.0 IU/mL Final    TSH 10/21/2024 1.630  0.270 - 4.200 uIU/mL Final    SELWYN 10/21/2024 Positive (A)   Final                                         Negative   <1:80                                       Borderline  1:80                                       Positive   >1:80    Please note 10/21/2024 Comment   Final    SELWYN Multiplex methodology was designed to detect up to 11 antibodies  of the 100+ antibodies that may be detected by SELWYN IFA methodology.    C-Reactive Protein 10/21/2024 0.56 (H)  0.00 - 0.50 mg/dL Final    IgA 10/21/2024 118  87 - 352 mg/dL Final    Gliadin Deamidated Peptide Ab, IgA 10/21/2024 3  0 - 19 units Final                       Negative                   0 - 19                     Weak Positive             20 - 30                     Moderate to Strong Positive   >30    Deaminated Gliadin Ab IgG 10/21/2024 2  0 - 19 units Final                       Negative                   0 - 19                     Weak Positive             20 - 30                     Moderate to Strong Positive   >30    Tissue Transglutaminase IgA 10/21/2024 <2  0 - 3 U/mL Final                                  Negative        0 -  3                                Weak Positive   4 - 10                                Positive           >10   Tissue Transglutaminase (tTG) has been identified   as the endomysial antigen.  Studies have demonstr-   ated that endomysial IgA antibodies have over 99%   specificity for gluten sensitive enteropathy.    Tissue Transglutaminase IgG  10/21/2024 4  0 - 5 U/mL Final                                  Negative        0 - 5                                Weak Positive   6 - 9                                Positive           >9    WBC 10/21/2024 13.24 (H)  3.40 - 10.80 10*3/mm3 Final    RBC 10/21/2024 5.09  3.77 - 5.28 10*6/mm3 Final    Hemoglobin 10/21/2024 13.5  12.0 - 15.9 g/dL Final    Hematocrit 10/21/2024 42.3  34.0 - 46.6 % Final    MCV 10/21/2024 83.1  79.0 - 97.0 fL Final    MCH 10/21/2024 26.5 (L)  26.6 - 33.0 pg Final    MCHC 10/21/2024 31.9  31.5 - 35.7 g/dL Final    RDW 10/21/2024 12.8  12.3 - 15.4 % Final    RDW-SD 10/21/2024 38.5  37.0 - 54.0 fl Final    MPV 10/21/2024 11.5  6.0 - 12.0 fL Final    Platelets 10/21/2024 408  140 - 450 10*3/mm3 Final    Neutrophil % 10/21/2024 61.8  42.7 - 76.0 % Final    Lymphocyte % 10/21/2024 31.2  19.6 - 45.3 % Final    Monocyte % 10/21/2024 4.3 (L)  5.0 - 12.0 % Final    Eosinophil % 10/21/2024 1.3  0.3 - 6.2 % Final    Basophil % 10/21/2024 0.7  0.0 - 1.5 % Final    Immature Grans % 10/21/2024 0.7 (H)  0.0 - 0.5 % Final    Neutrophils, Absolute 10/21/2024 8.19 (H)  1.70 - 7.00 10*3/mm3 Final    Lymphocytes, Absolute 10/21/2024 4.13 (H)  0.70 - 3.10 10*3/mm3 Final    Monocytes, Absolute 10/21/2024 0.57  0.10 - 0.90 10*3/mm3 Final    Eosinophils, Absolute 10/21/2024 0.17  0.00 - 0.40 10*3/mm3 Final    Basophils, Absolute 10/21/2024 0.09  0.00 - 0.20 10*3/mm3 Final    Immature Grans, Absolute 10/21/2024 0.09 (H)  0.00 - 0.05 10*3/mm3 Final    nRBC 10/21/2024 0.0  0.0 - 0.2 /100 WBC Final    Speckled Pattern 10/21/2024 1:80   Final    ICAP nomenclature: AC-2,4,5,29    Note: (Reference) 10/21/2024 Comment   Final    Comment: Pattern              Potential Disease Association  -------------  ---------------------------------------------  Homogeneous    Systemic Lupus Erythematosus, Drug Induced                 Systemic Lupus Erythematosus, Chronic                 Autoimmune hepatitis, Juvenile Idiopathic                  Arthritis  -------------  ---------------------------------------------  Speckled       Sjogren Syndrome, Systemic Lupus                 Erythematosus, Subacute Cutaneous Lupus,                  Lupus, Congenital Heart Block,                 Mixed Connective Tissue Disease,                 Scleroderma-diffuse, Scleroderma-Autoimmune                 Myositis Overlap Syndrome, Systemic Lupus                 Gotesrwlrmhye-Opidbfrskbh-Vcplztcvfz                 Myositis Overlap Syndrome, Systemic                 Autoimmune Rheumatic Disease,                 Undifferentiated Connective Tissue Disease  -------------  ---------------------------------------------  Nucleolar      Systemic                            Sclerosis, Scleroderma-Autoimmune                 Myositis Overlap Syndrome, Sjogren                 Syndrome, Raynaud phenomenon, Pulmonary                 Arterial Hypertension, Systemic Autoimmune                 Rheumatic Disease, Cancer  -------------  ---------------------------------------------  Centromere     Scleroderma-CREST, Limited Cutaneous SSc,                 Raynaud's Phenomenon, Primary Biliary                 Cholangitis  -------------  ---------------------------------------------  Nuclear Dot    Primary Biliary Cholangitis  -------------  ---------------------------------------------  Nuclear        Primary Biliary Cholangitis, Autoimmune  Membrane       Hepatitis/Liver disease, Systemic Autoimmune                 Rheumatic Disease, Autoimmune Cytopenias,                 Linear Scleroderma, Antiphospholipid Syndrome  -------------  ---------------------------------------------    Anti-DNA (DS) Ab Qn 10/21/2024 4  0 - 9 IU/mL Final                                       Negative      <5                                     Equivocal  5 - 9                                     Positive      >9    RNP Antibodies 10/21/2024 0.3  0.0 - 0.9 AI Final    German Antibodies 10/21/2024  <0.2  0.0 - 0.9 AI Final    Antiscleroderma-70 Antibodies 10/21/2024 <0.2  0.0 - 0.9 AI Final    Sjogren's Anti-SS-A 10/21/2024 <0.2  0.0 - 0.9 AI Final    Sjogren's Anti-SS-B 10/21/2024 <0.2  0.0 - 0.9 AI Final    Antichromatin Antibodies 10/21/2024 <0.2  0.0 - 0.9 AI Final    CARLOS-1 IgG 10/21/2024 <0.2  0.0 - 0.9 AI Final    Anti-Centromere B Antibodies 10/21/2024 <0.2  0.0 - 0.9 AI Final    See below: 10/21/2024 Comment   Final    Comment: Autoantibody                       Disease Association  ------------------------------------------------------------                          Condition                  Frequency  ---------------------   ------------------------   ---------  Antinuclear Antibody,    SLE, mixed connective  Direct (SELWYN-D)           tissue diseases  ---------------------   ------------------------   ---------  dsDNA                    SLE                        40 - 60%  ---------------------   ------------------------   ---------  Chromatin                Drug induced SLE                90%                           SLE                        48 - 97%  ---------------------   ------------------------   ---------  SSA (Ro)                 SLE                        25 - 35%                           Sjogren's Syndrome         40 - 70%                            Lupus                 100%  ---------------------   ------------------------   ---------  SSB (La)                 SLE                                                        10%                           Sjogren's Syndrome              30%  ---------------------   -----------------------    ---------  Sm (anti-Smith)          SLE                        15 - 30%  ---------------------   -----------------------    ---------  RNP                      Mixed Connective Tissue                           Disease                         95%  (U1 nRNP,                SLE                        30 - 50%  anti-ribonucleoprotein)  Polymyositis  and/or                           Dermatomyositis                 20%  ---------------------   ------------------------   ---------  Scl-70 (antiDNA          Scleroderma (diffuse)      20 - 35%  topoisomerase)           Crest                           13%  ---------------------   ------------------------   ---------  Keiko-1                     Polymyositis and/or                           Dermatomyositis            20 - 40%  ---------------------   ------------------------   ---------  Centromere B             Scleroderma -                            Crest                           variant                         80%       Assessment and Plan  Diagnoses and all orders for this visit:    1. Generalized anxiety disorder (Primary)    2. Acute cystitis without hematuria  -     POCT urinalysis dipstick, automated  -     nitrofurantoin, macrocrystal-monohydrate, (Macrobid) 100 MG capsule; Take 1 capsule by mouth 2 (Two) Times a Day.  Dispense: 14 capsule; Refill: 0  -     Comprehensive metabolic panel; Future    3. Iron deficiency anemia, unspecified iron deficiency anemia type  -     CBC No Differential; Future  -     Iron and TIBC; Future  -     Ferritin; Future    4. Vagal nerve sensitivity      Generalized anxiety disorder, now taking Zoloft 25 mg.  Recommend increasing Zoloft 50 mg in 3 weeks, will follow-up in 6 weeks.  Patient has moderate leukocytes in her urine, with dysuria we will treat empirically for urinary tract infection.  Previous iron deficiency anemia, last iron saturation was 12, will check iron ferritin and CBC.  May consider cessation of ferrous sulfate.  Patient's symptoms correlate with vagal nerve hypersensitivity, unclear triggers, discussed common triggers and trigger avoidance.  Will monitor patient's symptoms and follow-up in 6 weeks.          New Medications:   New Medications Ordered This Visit   Medications    nitrofurantoin, macrocrystal-monohydrate, (Macrobid) 100 MG capsule     Sig: Take 1  capsule by mouth 2 (Two) Times a Day.     Dispense:  14 capsule     Refill:  0       Discontinued Medications:   There are no discontinued medications.              Follow Up:   Return in about 6 weeks (around 1/21/2025).    Patient was given instructions and counseling regarding her condition or for health maintenance advice. Please see specific information pulled into the AVS if appropriate.       This document has been electronically signed by Julio C Morales DO   December 10, 2024 15:54 EST    Dictated Utilizing Dragon Dictation: Part of this note may be an electronic transcription/translation of spoken language to printed text using the Dragon Dictation System.

## 2024-12-11 LAB
ALBUMIN SERPL-MCNC: 4.3 G/DL (ref 3.5–5.2)
ALBUMIN/GLOB SERPL: 1.4 G/DL
ALP SERPL-CCNC: 80 U/L (ref 39–117)
ALT SERPL W P-5'-P-CCNC: 19 U/L (ref 1–33)
ANION GAP SERPL CALCULATED.3IONS-SCNC: 11 MMOL/L (ref 5–15)
AST SERPL-CCNC: 21 U/L (ref 1–32)
BILIRUB SERPL-MCNC: <0.2 MG/DL (ref 0–1.2)
BUN SERPL-MCNC: 8 MG/DL (ref 6–20)
BUN/CREAT SERPL: 11.3 (ref 7–25)
CALCIUM SPEC-SCNC: 9.1 MG/DL (ref 8.6–10.5)
CHLORIDE SERPL-SCNC: 101 MMOL/L (ref 98–107)
CO2 SERPL-SCNC: 24 MMOL/L (ref 22–29)
CREAT SERPL-MCNC: 0.71 MG/DL (ref 0.57–1)
DEPRECATED RDW RBC AUTO: 36.5 FL (ref 37–54)
EGFRCR SERPLBLD CKD-EPI 2021: 121.2 ML/MIN/1.73
ERYTHROCYTE [DISTWIDTH] IN BLOOD BY AUTOMATED COUNT: 12.3 % (ref 12.3–15.4)
FERRITIN SERPL-MCNC: 21.3 NG/ML (ref 13–150)
GLOBULIN UR ELPH-MCNC: 3 GM/DL
GLUCOSE SERPL-MCNC: 128 MG/DL (ref 65–99)
HCT VFR BLD AUTO: 40.8 % (ref 34–46.6)
HGB BLD-MCNC: 13.7 G/DL (ref 12–15.9)
IRON 24H UR-MRATE: 36 MCG/DL (ref 37–145)
IRON SATN MFR SERPL: 10 % (ref 20–50)
MCH RBC QN AUTO: 27.6 PG (ref 26.6–33)
MCHC RBC AUTO-ENTMCNC: 33.6 G/DL (ref 31.5–35.7)
MCV RBC AUTO: 82.1 FL (ref 79–97)
PLATELET # BLD AUTO: 392 10*3/MM3 (ref 140–450)
PMV BLD AUTO: 12 FL (ref 6–12)
POTASSIUM SERPL-SCNC: 3.8 MMOL/L (ref 3.5–5.2)
PROT SERPL-MCNC: 7.3 G/DL (ref 6–8.5)
RBC # BLD AUTO: 4.97 10*6/MM3 (ref 3.77–5.28)
SODIUM SERPL-SCNC: 136 MMOL/L (ref 136–145)
TIBC SERPL-MCNC: 347 MCG/DL (ref 298–536)
TRANSFERRIN SERPL-MCNC: 233 MG/DL (ref 200–360)
WBC NRBC COR # BLD AUTO: 9.15 10*3/MM3 (ref 3.4–10.8)

## 2024-12-20 DIAGNOSIS — F41.1 GENERALIZED ANXIETY DISORDER: ICD-10-CM

## 2024-12-20 DIAGNOSIS — R13.19 ESOPHAGEAL DYSPHAGIA: ICD-10-CM

## 2024-12-20 RX ORDER — SERTRALINE HYDROCHLORIDE 25 MG/1
25 TABLET, FILM COATED ORAL DAILY
Qty: 30 TABLET | Refills: 2 | OUTPATIENT
Start: 2024-12-20

## 2024-12-27 DIAGNOSIS — F41.1 GENERALIZED ANXIETY DISORDER: ICD-10-CM

## 2024-12-29 RX ORDER — SERTRALINE HYDROCHLORIDE 25 MG/1
25 TABLET, FILM COATED ORAL DAILY
Qty: 30 TABLET | Refills: 2 | Status: SHIPPED | OUTPATIENT
Start: 2024-12-29

## 2024-12-31 ENCOUNTER — TELEPHONE (OUTPATIENT)
Dept: FAMILY MEDICINE CLINIC | Facility: CLINIC | Age: 24
End: 2024-12-31
Payer: COMMERCIAL

## 2024-12-31 DIAGNOSIS — F41.1 GENERALIZED ANXIETY DISORDER: ICD-10-CM

## 2024-12-31 NOTE — TELEPHONE ENCOUNTER
----- Message from Julio C Morales sent at 12/31/2024 10:22 AM EST -----  Please let the patient know that her labs are normal other than iron deficiency. I want her to continue iron supplements at this time. If not improved by next visit we will have to consider iron infusion.

## 2024-12-31 NOTE — TELEPHONE ENCOUNTER
Patient notified & verbalized understanding.    Patient reports she was told if did ok with her Zoloft after two weeks she could double the cody,she has,will need updated RX to pharmacy for the increased dose?

## 2025-01-21 ENCOUNTER — OFFICE VISIT (OUTPATIENT)
Dept: FAMILY MEDICINE CLINIC | Facility: CLINIC | Age: 25
End: 2025-01-21
Payer: COMMERCIAL

## 2025-01-21 VITALS
WEIGHT: 199.2 LBS | DIASTOLIC BLOOD PRESSURE: 76 MMHG | HEIGHT: 65 IN | TEMPERATURE: 97.9 F | BODY MASS INDEX: 33.19 KG/M2 | OXYGEN SATURATION: 99 % | SYSTOLIC BLOOD PRESSURE: 120 MMHG | HEART RATE: 100 BPM

## 2025-01-21 DIAGNOSIS — G43.E19 INTRACTABLE CHRONIC MIGRAINE WITH AURA AND WITHOUT STATUS MIGRAINOSUS: ICD-10-CM

## 2025-01-21 DIAGNOSIS — D50.9 IRON DEFICIENCY ANEMIA, UNSPECIFIED IRON DEFICIENCY ANEMIA TYPE: ICD-10-CM

## 2025-01-21 DIAGNOSIS — R13.19 ESOPHAGEAL DYSPHAGIA: ICD-10-CM

## 2025-01-21 DIAGNOSIS — F41.1 GENERALIZED ANXIETY DISORDER: ICD-10-CM

## 2025-01-21 DIAGNOSIS — N92.0 MENORRHAGIA WITH REGULAR CYCLE: Primary | ICD-10-CM

## 2025-01-21 PROCEDURE — 99214 OFFICE O/P EST MOD 30 MIN: CPT | Performed by: STUDENT IN AN ORGANIZED HEALTH CARE EDUCATION/TRAINING PROGRAM

## 2025-01-21 RX ORDER — NORGESTIMATE AND ETHINYL ESTRADIOL 0.25-0.035
1 KIT ORAL DAILY
Qty: 84 TABLET | Refills: 3 | Status: SHIPPED | OUTPATIENT
Start: 2025-01-21

## 2025-01-21 NOTE — PROGRESS NOTES
Chief Complaint  Anxiety (Follow up )    HPI:   Anxiety       Pricila Brown is a 24 y.o. female who presents today to [facility] for Anxiety (Follow up ).  History of Present Illness  The patient presents for evaluation of heavy menstrual bleeding, iron deficiency, migraines, acid reflux, and anxiety.    She has been experiencing heavy menstrual bleeding, which she did not encounter prior to her second pregnancy. She reports the passage of large blood clots during her last menstrual cycle, accompanied by significant discomfort. Additionally, she experiences pain during ovulation, a symptom that was previously absent. Her menstrual cycles typically last between 5 to 7 days and are characterized by heavy flow. Recently, she has noticed a slight irregularity in her cycles, with them being a few days off. She expresses a preference for oral contraceptive pills over an intrauterine device (IUD).    She has been on a daily regimen of iron supplements, which have resulted in constipation. She has been taking oral iron supplements for approximately one month, since her last blood work on 10th.    She is currently on Nurtec for migraine management, which is covered by her insurance and has proven effective in controlling her symptoms.    She is also taking omeprazole once daily for acid reflux, which has shown improvement. However, she reports feeling unwell upon waking, leading her to skip breakfast and lunch. She describes a sensation of residual stomach contents, although it is not as severe as before.    She was started on Zoloft 25 mg, which initially worked but wore off. She then increased the dose to 50 mg, which has been effective.    MEDICATIONS  Current: iron, Nurtec, omeprazole, Zoloft       Previous History:   Past Medical History:   Diagnosis Date    Anxiety     Asthma     Dysfunctional gallbladder     Fast heart beat     GERD (gastroesophageal reflux disease)     Hx of  "migraines     Nausea     Pain     RUQ      Past Surgical History:   Procedure Laterality Date    CHOLECYSTECTOMY N/A 2/2/2024    Procedure: CHOLECYSTECTOMY LAPAROSCOPIC WITH DAVINCI ROBOT;  Surgeon: Domo Mckeon MD;  Location: [facility];  Service: Robotics - DaVinci;  Laterality: N/A;    D & C WITH SUCTION N/A 11/29/2018    Procedure: DILATATION AND CURETTAGE WITH SUCTION;  Surgeon: Estela Gupta DO;  Location: [facility];  Service: Obstetrics/Gynecology      Social History     Socioeconomic History    Marital status:    Tobacco Use    Smoking status: Never    Smokeless tobacco: Never   Vaping Use    Vaping status: Never Used   Substance and Sexual Activity    Alcohol use: No    Drug use: No    Sexual activity: Yes     Partners: Male     Birth control/protection: None      Health Maintenance Due   Topic Date Due    HPV VACCINES (1 - 3-dose series) Never done    ANNUAL PHYSICAL  Never done    PAP SMEAR  Never done    COVID-19 Vaccine (3 - 2024-25 season) 09/01/2024        Current Medications:  Current Outpatient Medications   Medication Sig Dispense Refill    ferrous sulfate 325 (65 FE) MG tablet Take 1 tablet by mouth Daily With Breakfast.      omeprazole (priLOSEC) 20 MG capsule Take 1 capsule by mouth Daily. 30 capsule 2    Rimegepant Sulfate (Nurtec) 75 MG tablet dispersible tablet Take 1 tablet by mouth Daily As Needed (migrain). 9 tablet 0    sertraline (Zoloft) 50 MG tablet Take 1 tablet by mouth Daily. 90 tablet 1    norgestimate-ethinyl estradiol (ORTHO-CYCLEN) 0.25-35 MG-MCG per tablet Take 1 tablet by mouth Daily. 84 tablet 3     No current facility-administered medications for this visit.       Allergies:   Allergies   Allergen Reactions    Bactrim [Sulfamethoxazole-Trimethoprim] Hives    Penicillins Rash       Vitals:   /76 (BP Location: Right arm, Patient Position: Sitting)   Pulse 100   Temp 97.9 °F (36.6 °C) (Temporal)   Ht 165.1 cm (65\")   Wt 90.4 kg (199 lb 3.2 oz)   " "LMP 12/31/2024   SpO2 99%   BMI 33.15 kg/m²     Estimated body mass index is 33.15 kg/m² as calculated from the following:    Height as of this encounter: 165.1 cm (65\").    Weight as of this encounter: 90.4 kg (199 lb 3.2 oz).    Pricila Brown  reports that she has never smoked. She has never used smokeless tobacco.            Physical Exam:   Physical Exam  Constitutional:       Appearance: Normal appearance.   HENT:      Mouth/Throat:      Mouth: Mucous membranes are moist.   Cardiovascular:      Rate and Rhythm: Normal rate and regular rhythm.   Pulmonary:      Effort: Pulmonary effort is normal.      Breath sounds: Normal breath sounds. No wheezing or rhonchi.   Musculoskeletal:         General: Normal range of motion.   Skin:     General: Skin is warm and dry.   Neurological:      Mental Status: She is alert.   Psychiatric:         Mood and Affect: Mood normal.          Lab Results:   Lab on 12/10/2024   Component Date Value Ref Range Status    WBC 12/10/2024 9.15  3.40 - 10.80 10*3/mm3 Final    RBC 12/10/2024 4.97  3.77 - 5.28 10*6/mm3 Final    Hemoglobin 12/10/2024 13.7  12.0 - 15.9 g/dL Final    Hematocrit 12/10/2024 40.8  34.0 - 46.6 % Final    MCV 12/10/2024 82.1  79.0 - 97.0 fL Final    MCH 12/10/2024 27.6  26.6 - 33.0 pg Final    MCHC 12/10/2024 33.6  31.5 - 35.7 g/dL Final    RDW 12/10/2024 12.3  12.3 - 15.4 % Final    RDW-SD 12/10/2024 36.5 (L)  37.0 - 54.0 fl Final    MPV 12/10/2024 12.0  6.0 - 12.0 fL Final    Platelets 12/10/2024 392  140 - 450 10*3/mm3 Final    Iron 12/10/2024 36 (L)  37 - 145 mcg/dL Final    Iron Saturation (TSAT) 12/10/2024 10 (L)  20 - 50 % Final    Transferrin 12/10/2024 233  200 - 360 mg/dL Final    TIBC 12/10/2024 347  298 - 536 mcg/dL Final    Ferritin 12/10/2024 21.30  13.00 - 150.00 ng/mL Final    Glucose 12/10/2024 128 (H)  65 - 99 mg/dL Final    BUN 12/10/2024 8  6 - 20 mg/dL Final    Creatinine 12/10/2024 0.71  0.57 - 1.00 mg/dL Final    Sodium 12/10/2024 136  " 136 - 145 mmol/L Final    Potassium 12/10/2024 3.8  3.5 - 5.2 mmol/L Final    Chloride 12/10/2024 101  98 - 107 mmol/L Final    CO2 12/10/2024 24.0  22.0 - 29.0 mmol/L Final    Calcium 12/10/2024 9.1  8.6 - 10.5 mg/dL Final    Total Protein 12/10/2024 7.3  6.0 - 8.5 g/dL Final    Albumin 12/10/2024 4.3  3.5 - 5.2 g/dL Final    ALT (SGPT) 12/10/2024 19  1 - 33 U/L Final    AST (SGOT) 12/10/2024 21  1 - 32 U/L Final    Alkaline Phosphatase 12/10/2024 80  39 - 117 U/L Final    Total Bilirubin 12/10/2024 <0.2  0.0 - 1.2 mg/dL Final    Globulin 12/10/2024 3.0  gm/dL Final    A/G Ratio 12/10/2024 1.4  g/dL Final    BUN/Creatinine Ratio 12/10/2024 11.3  7.0 - 25.0 Final    Anion Gap 12/10/2024 11.0  5.0 - 15.0 mmol/L Final    eGFR 12/10/2024 121.2  >60.0 mL/min/1.73 Final   Office Visit on 12/10/2024   Component Date Value Ref Range Status    Color 12/10/2024 Yellow  Yellow, Straw, Dark Yellow, [place] Final    Clarity, UA 12/10/2024 Clear  Clear Final    Specific Gravity  12/10/2024 1.025  1.005 - 1.030 Final    pH, Urine 12/10/2024 6.0  5.0 - 8.0 Final    Leukocytes 12/10/2024 Negative  Negative Final    Nitrite, UA 12/10/2024 Negative  Negative Final    Protein, POC 12/10/2024 Trace (A)  Negative mg/dL Final    Glucose, UA 12/10/2024 Negative  Negative mg/dL Final    Ketones, UA 12/10/2024 Negative  Negative Final    Urobilinogen, UA 12/10/2024 Normal  Normal, 0.2 E.U./dL Final    Bilirubin 12/10/2024 Negative  Negative Final    Blood, UA 12/10/2024 Negative  Negative Final    Lot Number 12/10/2024 [id number]   Final    Expiration Date 12/10/2024 1,142,025   Final   Lab on 10/21/2024   Component Date Value Ref Range Status    Glucose 10/21/2024 80  65 - 99 mg/dL Final    BUN 10/21/2024 13  6 - 20 mg/dL Final    Creatinine 10/21/2024 1.07 (H)  0.57 - 1.00 mg/dL Final    Sodium 10/21/2024 134 (L)  136 - 145 mmol/L Final    Potassium 10/21/2024 4.5  3.5 - 5.2 mmol/L Final    Chloride 10/21/2024 99  98 - 107 mmol/L Final     CO2 10/21/2024 23.7  22.0 - 29.0 mmol/L Final    Calcium 10/21/2024 9.7  8.6 - 10.5 mg/dL Final    Total Protein 10/21/2024 7.7  6.0 - 8.5 g/dL Final    Albumin 10/21/2024 4.3  3.5 - 5.2 g/dL Final    ALT (SGPT) 10/21/2024 16  1 - 33 U/L Final    AST (SGOT) 10/21/2024 14  1 - 32 U/L Final    Alkaline Phosphatase 10/21/2024 71  39 - 117 U/L Final    Total Bilirubin 10/21/2024 <0.2  0.0 - 1.2 mg/dL Final    Globulin 10/21/2024 3.4  gm/dL Final    A/G Ratio 10/21/2024 1.3  g/dL Final    BUN/Creatinine Ratio 10/21/2024 12.1  7.0 - 25.0 Final    Anion Gap 10/21/2024 11.3  5.0 - 15.0 mmol/L Final    eGFR 10/21/2024 74.1  >60.0 mL/min/1.73 Final    Iron 10/21/2024 46  37 - 145 mcg/dL Final    Iron Saturation (TSAT) 10/21/2024 12 (L)  20 - 50 % Final    Transferrin 10/21/2024 256  200 - 360 mg/dL Final    TIBC 10/21/2024 381  298 - 536 mcg/dL Final    14.3.3 ETA, Rheum. Arthritis 10/21/2024 <0.20  ng/mL Final    Comment: Reference Range: < 0.20  Comments:  14-3-3 eta protein is a joint-derived, proinflammatory   that is implicated in the joint erosion process  and pathogenesis of RA.  Serum 14-3-3 eta is elevated in  both early and established RA.  - Diagnostic value:    14-3-3 eta is highly specific for RA.    Serum 14-3-3 eta may be especially helpful in identifying    patients with early RA where it provides a 15% incremental    benefit to the diagnostic sensitivity of markers,    Rheumatoid Arthritis (RA) Factor and Cyclic Citrullinated    Peptide (CCP) Antibodies, i.e. An additional 15% of early    RA patients may be detected by 14-3-3 eta (1).  - Correlation with radiographic evidence of joint damage.    Positive serum 14-3-3 eta levels are associated with    higher rates of joint damage as measured by radiographic    assessments (Sharp/van isamar Heijde Score) (2).  - Serum 14-3-3 eta levels above a threshold of 0.50 ng/ml    identify RA patients who will have more rapid radiographic                                progression, even those who may be in SDAI remission (1).  References:  1. Eitan N, et al. Serum levels of 14-3-3 eta protein     supplement C-reactive protein and rheumatoid arthritis-     associated antibodies to predict clinical and     radiographic outcomes in a  prospective cohort of     patients with recent-onset inflammatory polyarthritis.     Arthritis Res Ther 2016;18:37  2. Shira FINNEY, et al. 14-3-3 eta is a novel      associated with the pathogenesis of rheumatoid arthritis     and joint damage. Arthritis Res Ther 2014;16:R99.  This test was developed and its performance characteristics  determined by [employer]. It has not been cleared or approved  by the Food and Drug Administration.    CCP Antibodies IgG/IgA 10/21/2024 5  0 - 19 units Final                              Negative               <20                            Weak positive      20 - 39                            Moderate positive  40 - 59                            Strong positive        >59    Rheumatoid Factor Quantitative 10/21/2024 10.0  0.0 - 14.0 IU/mL Final    TSH 10/21/2024 1.630  0.270 - 4.200 uIU/mL Final    SELWYN 10/21/2024 Positive (A)   Final                                         Negative   <1:80                                       Borderline  1:80                                       Positive   >1:80    Please note 10/21/2024 Comment   Final    SELWYN Multiplex methodology was designed to detect up to 11 antibodies  of the 100+ antibodies that may be detected by SELWYN IFA methodology.    C-Reactive Protein 10/21/2024 0.56 (H)  0.00 - 0.50 mg/dL Final    IgA 10/21/2024 118  87 - 352 mg/dL Final    Gliadin Deamidated Peptide Ab, IgA 10/21/2024 3  0 - 19 units Final                       Negative                   0 - 19                     Weak Positive             20 - 30                     Moderate to Strong Positive   >30    Deaminated Gliadin Ab IgG 10/21/2024 2  0 - 19 units Final                        Negative                   0 - 19                     Weak Positive             20 - 30                     Moderate to Strong Positive   >30    Tissue Transglutaminase IgA 10/21/2024 <2  0 - 3 U/mL Final                                  Negative        0 -  3                                Weak Positive   4 - 10                                Positive           >10   Tissue Transglutaminase (tTG) has been identified   as the endomysial antigen.  Studies have demonstr-   ated that endomysial IgA antibodies have over 99%   specificity for gluten sensitive enteropathy.    Tissue Transglutaminase IgG 10/21/2024 4  0 - 5 U/mL Final                                  Negative        0 - 5                                Weak Positive   6 - 9                                Positive           >9    WBC 10/21/2024 13.24 (H)  3.40 - 10.80 10*3/mm3 Final    RBC 10/21/2024 5.09  3.77 - 5.28 10*6/mm3 Final    Hemoglobin 10/21/2024 13.5  12.0 - 15.9 g/dL Final    Hematocrit 10/21/2024 42.3  34.0 - 46.6 % Final    MCV 10/21/2024 83.1  79.0 - 97.0 fL Final    MCH 10/21/2024 26.5 (L)  26.6 - 33.0 pg Final    MCHC 10/21/2024 31.9  31.5 - 35.7 g/dL Final    RDW 10/21/2024 12.8  12.3 - 15.4 % Final    RDW-SD 10/21/2024 38.5  37.0 - 54.0 fl Final    MPV 10/21/2024 11.5  6.0 - 12.0 fL Final    Platelets 10/21/2024 408  140 - 450 10*3/mm3 Final    Neutrophil % 10/21/2024 61.8  42.7 - 76.0 % Final    Lymphocyte % 10/21/2024 31.2  19.6 - 45.3 % Final    Monocyte % 10/21/2024 4.3 (L)  5.0 - 12.0 % Final    Eosinophil % 10/21/2024 1.3  0.3 - 6.2 % Final    Basophil % 10/21/2024 0.7  0.0 - 1.5 % Final    Immature Grans % 10/21/2024 0.7 (H)  0.0 - 0.5 % Final    Neutrophils, Absolute 10/21/2024 8.19 (H)  1.70 - 7.00 10*3/mm3 Final    Lymphocytes, Absolute 10/21/2024 4.13 (H)  0.70 - 3.10 10*3/mm3 Final    Monocytes, Absolute 10/21/2024 0.57  0.10 - 0.90 10*3/mm3 Final    Eosinophils, Absolute 10/21/2024 0.17  0.00 - 0.40 10*3/mm3 Final     Basophils, Absolute 10/21/2024 0.09  0.00 - 0.20 10*3/mm3 Final    Immature Grans, Absolute 10/21/2024 0.09 (H)  0.00 - 0.05 10*3/mm3 Final    nRBC 10/21/2024 0.0  0.0 - 0.2 /100 WBC Final    Speckled Pattern 10/21/2024 1:80   Final    ICAP nomenclature: AC-2,4,5,29    Note: (Reference) 10/21/2024 Comment   Final    Comment: Pattern              Potential Disease Association  -------------  ---------------------------------------------  Homogeneous    Systemic Lupus Erythematosus, Drug Induced                 Systemic Lupus Erythematosus, Chronic                 Autoimmune hepatitis, Juvenile Idiopathic                 Arthritis  -------------  ---------------------------------------------  Speckled       Sjogren Syndrome, Systemic Lupus                 Erythematosus, Subacute Cutaneous Lupus,                  Lupus, Congenital Heart Block,                 Mixed Connective Tissue Disease,                 Scleroderma-diffuse, Scleroderma-Autoimmune                 Myositis Overlap Syndrome, Systemic Lupus                 Crayqdubnmykl-Jrkuikootop-Skwrhtzuhs                 Myositis Overlap Syndrome, Systemic                 Autoimmune Rheumatic Disease,                 Undifferentiated Connective Tissue Disease  -------------  ---------------------------------------------  Nucleolar      Systemic                            Sclerosis, Scleroderma-Autoimmune                 Myositis Overlap Syndrome, Sjogren                 Syndrome, Raynaud phenomenon, Pulmonary                 Arterial Hypertension, Systemic Autoimmune                 Rheumatic Disease, Cancer  -------------  ---------------------------------------------  Centromere     Scleroderma-CREST, Limited Cutaneous SSc,                 Raynaud's Phenomenon, Primary Biliary                 Cholangitis  -------------  ---------------------------------------------  Nuclear Dot    Primary Biliary Cholangitis  -------------   ---------------------------------------------  Nuclear        Primary Biliary Cholangitis, Autoimmune  Membrane       Hepatitis/Liver disease, Systemic Autoimmune                 Rheumatic Disease, Autoimmune Cytopenias,                 Linear Scleroderma, Antiphospholipid Syndrome  -------------  ---------------------------------------------    Anti-DNA (DS) Ab Qn 10/21/2024 4  0 - 9 IU/mL Final                                       Negative      <5                                     Equivocal  5 - 9                                     Positive      >9    RNP Antibodies 10/21/2024 0.3  0.0 - 0.9 AI Final    Greman Antibodies 10/21/2024 <0.2  0.0 - 0.9 AI Final    Antiscleroderma-70 Antibodies 10/21/2024 <0.2  0.0 - 0.9 AI Final    Sjogren's Anti-SS-A 10/21/2024 <0.2  0.0 - 0.9 AI Final    Sjogren's Anti-SS-B 10/21/2024 <0.2  0.0 - 0.9 AI Final    Antichromatin Antibodies 10/21/2024 <0.2  0.0 - 0.9 AI Final    CARLOS-1 IgG 10/21/2024 <0.2  0.0 - 0.9 AI Final    Anti-Centromere B Antibodies 10/21/2024 <0.2  0.0 - 0.9 AI Final    See below: 10/21/2024 Comment   Final    Comment: Autoantibody                       Disease Association  ------------------------------------------------------------                          Condition                  Frequency  ---------------------   ------------------------   ---------  Antinuclear Antibody,    SLE, mixed connective  Direct (SELWYN-D)           tissue diseases  ---------------------   ------------------------   ---------  dsDNA                    SLE                        40 - 60%  ---------------------   ------------------------   ---------  Chromatin                Drug induced SLE                90%                           SLE                        48 - 97%  ---------------------   ------------------------   ---------  SSA (Ro)                 SLE                        25 - 35%                           Sjogren's Syndrome         40 - 70%                             Lupus                 100%  ---------------------   ------------------------   ---------  SSB (La)                 SLE                                                        10%                           Sjogren's Syndrome              30%  ---------------------   -----------------------    ---------  Sm (anti-Smith)          SLE                        15 - 30%  ---------------------   -----------------------    ---------  RNP                      Mixed Connective Tissue                           Disease                         95%  (U1 nRNP,                SLE                        30 - 50%  anti-ribonucleoprotein)  Polymyositis and/or                           Dermatomyositis                 20%  ---------------------   ------------------------   ---------  Scl-70 (antiDNA          Scleroderma (diffuse)      20 - 35%  topoisomerase)           Crest                           13%  ---------------------   ------------------------   ---------  Keiko-1                     Polymyositis and/or                           Dermatomyositis            20 - 40%  ---------------------   ------------------------   ---------  Centromere B             Scleroderma -                            Crest                           variant                         80%       Assessment and Plan  Diagnoses and all orders for this visit:    1. Menorrhagia with regular cycle (Primary)  -     norgestimate-ethinyl estradiol (ORTHO-CYCLEN) 0.25-35 MG-MCG per tablet; Take 1 tablet by mouth Daily.  Dispense: 84 tablet; Refill: 3    2. Iron deficiency anemia, unspecified iron deficiency anemia type    3. Intractable chronic migraine with aura and without status migrainosus    4. Esophageal dysphagia    5. Generalized anxiety disorder      Assessment & Plan  1. Menorrhagia.  Her iron deficiency is likely due to significant blood loss during menstruation. Various birth control options were discussed, including oral contraceptive pills,  intrauterine devices (IUDs), and Nexplanon. She expressed a preference for oral contraceptive pills. The potential risks and benefits of oral contraceptives were thoroughly explained, including the rare risk of blood clots, especially in smokers and older individuals. She was advised to monitor for any sudden leg swelling. The regimen includes three weeks of active tablets followed by one week of inactive tablets, during which a withdrawal period will occur. She was informed that she could skip periods by continuing onto the next pack, but it is recommended to have approximately six periods per year to reduce the risk of endometrial carcinoma. The protective effect of oral contraceptives against ovarian cancer was also discussed. A prescription for a three-month supply of oral contraceptive pills was provided.    2. Iron Deficiency.  She has been taking iron supplements daily but experiences constipation as a side effect. It is premature to assess her iron levels at this time; therefore, a re-evaluation will be conducted in two months.    3. Migraines.  Her migraines are well-controlled with Nurtec, which is covered by her insurance.    4. Gastroesophageal Reflux Disease (GERD).  She is currently taking omeprazole once a day for acid reflux, which has improved her symptoms.    5. SHAI  She was started on Zoloft 25 mg, which initially worked but wore off. She then increased the dose to 50 mg, which has been effective. She was advised that if the current dose becomes less effective over time, an increase to 75 mg or 100 mg may be considered.    Follow-up  The patient will follow up in 2 months.               New Medications:   New Medications Ordered This Visit   Medications    norgestimate-ethinyl estradiol (ORTHO-CYCLEN) 0.25-35 MG-MCG per tablet     Sig: Take 1 tablet by mouth Daily.     Dispense:  84 tablet     Refill:  3       Discontinued Medications:   Medications Discontinued During This Encounter   Medication  Reason    nitrofurantoin, macrocrystal-monohydrate, (Macrobid) 100 MG capsule *Therapy completed                 Follow Up:   Return in about 2 months (around 3/21/2025).    Patient was given instructions and counseling regarding her condition or for health maintenance advice. Please see specific information pulled into the AVS if appropriate.     Patient or patient representative verbalized consent for the use of Ambient Listening during the visit with  Julio C Morales DO for chart documentation. 1/21/2025  10:38 EST       This document has been electronically signed by Julio C Morales DO   January 21, 2025 10:38 EST      Dictated Utilizing Dragon Dictation: Part of this note may be an electronic transcription/translation of spoken language to printed text using the Dragon Dictation System.

## 2025-03-25 ENCOUNTER — OFFICE VISIT (OUTPATIENT)
Dept: FAMILY MEDICINE CLINIC | Facility: CLINIC | Age: 25
End: 2025-03-25
Payer: COMMERCIAL

## 2025-03-25 ENCOUNTER — LAB (OUTPATIENT)
Dept: FAMILY MEDICINE CLINIC | Facility: CLINIC | Age: 25
End: 2025-03-25
Payer: COMMERCIAL

## 2025-03-25 VITALS
SYSTOLIC BLOOD PRESSURE: 132 MMHG | BODY MASS INDEX: 32.69 KG/M2 | TEMPERATURE: 97.1 F | HEART RATE: 89 BPM | OXYGEN SATURATION: 98 % | HEIGHT: 65 IN | WEIGHT: 196.2 LBS | DIASTOLIC BLOOD PRESSURE: 84 MMHG

## 2025-03-25 DIAGNOSIS — G43.E19 INTRACTABLE CHRONIC MIGRAINE WITH AURA AND WITHOUT STATUS MIGRAINOSUS: ICD-10-CM

## 2025-03-25 DIAGNOSIS — F41.1 GENERALIZED ANXIETY DISORDER: ICD-10-CM

## 2025-03-25 DIAGNOSIS — R13.19 ESOPHAGEAL DYSPHAGIA: ICD-10-CM

## 2025-03-25 DIAGNOSIS — D50.9 IRON DEFICIENCY ANEMIA, UNSPECIFIED IRON DEFICIENCY ANEMIA TYPE: ICD-10-CM

## 2025-03-25 DIAGNOSIS — N92.0 MENORRHAGIA WITH REGULAR CYCLE: Primary | ICD-10-CM

## 2025-03-25 LAB
BASOPHILS # BLD AUTO: 0.04 10*3/MM3 (ref 0–0.2)
BASOPHILS NFR BLD AUTO: 0.5 % (ref 0–1.5)
DEPRECATED RDW RBC AUTO: 36.5 FL (ref 37–54)
EOSINOPHIL # BLD AUTO: 0.09 10*3/MM3 (ref 0–0.4)
EOSINOPHIL NFR BLD AUTO: 1.2 % (ref 0.3–6.2)
ERYTHROCYTE [DISTWIDTH] IN BLOOD BY AUTOMATED COUNT: 12.1 % (ref 12.3–15.4)
HCT VFR BLD AUTO: 39.5 % (ref 34–46.6)
HGB BLD-MCNC: 13.3 G/DL (ref 12–15.9)
IMM GRANULOCYTES # BLD AUTO: 0.01 10*3/MM3 (ref 0–0.05)
IMM GRANULOCYTES NFR BLD AUTO: 0.1 % (ref 0–0.5)
LYMPHOCYTES # BLD AUTO: 2.71 10*3/MM3 (ref 0.7–3.1)
LYMPHOCYTES NFR BLD AUTO: 35.2 % (ref 19.6–45.3)
MCH RBC QN AUTO: 27.8 PG (ref 26.6–33)
MCHC RBC AUTO-ENTMCNC: 33.7 G/DL (ref 31.5–35.7)
MCV RBC AUTO: 82.6 FL (ref 79–97)
MONOCYTES # BLD AUTO: 0.45 10*3/MM3 (ref 0.1–0.9)
MONOCYTES NFR BLD AUTO: 5.9 % (ref 5–12)
NEUTROPHILS NFR BLD AUTO: 4.39 10*3/MM3 (ref 1.7–7)
NEUTROPHILS NFR BLD AUTO: 57.1 % (ref 42.7–76)
NRBC BLD AUTO-RTO: 0 /100 WBC (ref 0–0.2)
PLATELET # BLD AUTO: 334 10*3/MM3 (ref 140–450)
PMV BLD AUTO: 10.9 FL (ref 6–12)
RBC # BLD AUTO: 4.78 10*6/MM3 (ref 3.77–5.28)
WBC NRBC COR # BLD AUTO: 7.69 10*3/MM3 (ref 3.4–10.8)

## 2025-03-25 PROCEDURE — 82728 ASSAY OF FERRITIN: CPT | Performed by: STUDENT IN AN ORGANIZED HEALTH CARE EDUCATION/TRAINING PROGRAM

## 2025-03-25 PROCEDURE — 84466 ASSAY OF TRANSFERRIN: CPT | Performed by: STUDENT IN AN ORGANIZED HEALTH CARE EDUCATION/TRAINING PROGRAM

## 2025-03-25 PROCEDURE — 85025 COMPLETE CBC W/AUTO DIFF WBC: CPT | Performed by: STUDENT IN AN ORGANIZED HEALTH CARE EDUCATION/TRAINING PROGRAM

## 2025-03-25 PROCEDURE — 99214 OFFICE O/P EST MOD 30 MIN: CPT | Performed by: STUDENT IN AN ORGANIZED HEALTH CARE EDUCATION/TRAINING PROGRAM

## 2025-03-25 PROCEDURE — 36415 COLL VENOUS BLD VENIPUNCTURE: CPT

## 2025-03-25 PROCEDURE — 83540 ASSAY OF IRON: CPT | Performed by: STUDENT IN AN ORGANIZED HEALTH CARE EDUCATION/TRAINING PROGRAM

## 2025-03-25 RX ORDER — OMEPRAZOLE 20 MG/1
20 CAPSULE, DELAYED RELEASE ORAL DAILY
Qty: 30 CAPSULE | Refills: 2 | Status: SHIPPED | OUTPATIENT
Start: 2025-03-25

## 2025-03-25 NOTE — PROGRESS NOTES
Chief Complaint  Menorrhagia with regular cycle (Follow up)    HPI:   HPI   Pricila Brown is a 24 y.o. female who presents today to [facility] for Menorrhagia with regular cycle (Follow up).  History of Present Illness  The patient presents for evaluation of heavy menstrual bleeding.    She reports persistent heavy menstrual bleeding, which she has not attempted to manage by transitioning directly to the next pack of her contraceptive pills. The duration of her menstrual cycle is approximately 5 days, characterized by significant blood loss. This pattern of heavy bleeding was observed following the birth of her second child. She has not sought any obstetric or gynecological intervention since then, except for a routine 6-week postpartum follow-up. She did not experience menstruation immediately postpartum due to breastfeeding. She also reports dyspareunia and occasional radiating abdominal pain extending to her hip. The initiation of birth control has not resulted in any noticeable improvement in her abdominal pain. She has only experienced one menstrual cycle since starting the birth control and is currently anticipating her next cycle. She expresses a preference to continue her care with Dr. Ricardo at [facility]. She reports no other issues related to her birth control.    She continues to experience constipation, which she manages with a daily stool softener, providing some relief.    Her migraines are well-controlled, and she has not required frequent use of Nurtec. However, she did need to take Nurtec during her last menstrual cycle due to severe symptoms.    Her anxiety is also well-managed.       Previous History:   Past Medical History:   Diagnosis Date    Anxiety     Asthma     Dysfunctional gallbladder     Fast heart beat     GERD (gastroesophageal reflux disease)     Hx of migraines     Nausea     Pain     RUQ      Past Surgical History:   Procedure  "Laterality Date    CHOLECYSTECTOMY N/A 2/2/2024    Procedure: CHOLECYSTECTOMY LAPAROSCOPIC WITH DAVINCI ROBOT;  Surgeon: Domo Mckeon MD;  Location: [facility] OR;  Service: Robotics - DaVinci;  Laterality: N/A;    D & C WITH SUCTION N/A 11/29/2018    Procedure: DILATATION AND CURETTAGE WITH SUCTION;  Surgeon: Estela Gupta DO;  Location: [facility] OR;  Service: Obstetrics/Gynecology      Social History     Socioeconomic History    Marital status:    Tobacco Use    Smoking status: Never    Smokeless tobacco: Never   Vaping Use    Vaping status: Never Used   Substance and Sexual Activity    Alcohol use: No    Drug use: No    Sexual activity: Yes     Partners: Male     Birth control/protection: None      Health Maintenance Due   Topic Date Due    HPV VACCINES (1 - 3-dose series) Never done    ANNUAL PHYSICAL  Never done    PAP SMEAR  Never done    COVID-19 Vaccine (3 - 2024-25 season) 09/01/2024        Current Medications:  Current Outpatient Medications   Medication Sig Dispense Refill    ferrous sulfate 325 (65 FE) MG tablet Take 1 tablet by mouth Daily With Breakfast.      norgestimate-ethinyl estradiol (ORTHO-CYCLEN) 0.25-35 MG-MCG per tablet Take 1 tablet by mouth Daily. 84 tablet 3    omeprazole (priLOSEC) 20 MG capsule Take 1 capsule by mouth Daily. 30 capsule 2    Rimegepant Sulfate (Nurtec) 75 MG tablet dispersible tablet Take 1 tablet by mouth Daily As Needed (migrain). 9 tablet 0    sertraline (Zoloft) 50 MG tablet Take 1 tablet by mouth Daily. 90 tablet 2     No current facility-administered medications for this visit.       Allergies:   Allergies   Allergen Reactions    Bactrim [Sulfamethoxazole-Trimethoprim] Hives    Penicillins Rash       Vitals:   /84 (BP Location: Right arm, Patient Position: Sitting, Cuff Size: Adult)   Pulse 89   Temp 97.1 °F (36.2 °C) (Temporal)   Ht 165.1 cm (65\")   Wt 89 kg (196 lb 3.2 oz)   LMP 02/26/2025   SpO2 98%   BMI 32.65 kg/m²     Estimated body " "mass index is 32.65 kg/m² as calculated from the following:    Height as of this encounter: 165.1 cm (65\").    Weight as of this encounter: 89 kg (196 lb 3.2 oz).    Pricila Brown  reports that she has never smoked. She has never used smokeless tobacco.          Physical Exam:   Physical Exam  Constitutional:       Appearance: Normal appearance.   HENT:      Mouth/Throat:      Mouth: Mucous membranes are moist.   Cardiovascular:      Rate and Rhythm: Normal rate and regular rhythm.   Pulmonary:      Effort: Pulmonary effort is normal.      Breath sounds: Normal breath sounds. No wheezing or rhonchi.   Musculoskeletal:         General: Normal range of motion.   Skin:     General: Skin is warm and dry.   Neurological:      Mental Status: She is alert.   Psychiatric:         Mood and Affect: Mood normal.          Lab Results:   Lab on 12/10/2024   Component Date Value Ref Range Status    WBC 12/10/2024 9.15  3.40 - 10.80 10*3/mm3 Final    RBC 12/10/2024 4.97  3.77 - 5.28 10*6/mm3 Final    Hemoglobin 12/10/2024 13.7  12.0 - 15.9 g/dL Final    Hematocrit 12/10/2024 40.8  34.0 - 46.6 % Final    MCV 12/10/2024 82.1  79.0 - 97.0 fL Final    MCH 12/10/2024 27.6  26.6 - 33.0 pg Final    MCHC 12/10/2024 33.6  31.5 - 35.7 g/dL Final    RDW 12/10/2024 12.3  12.3 - 15.4 % Final    RDW-SD 12/10/2024 36.5 (L)  37.0 - 54.0 fl Final    MPV 12/10/2024 12.0  6.0 - 12.0 fL Final    Platelets 12/10/2024 392  140 - 450 10*3/mm3 Final    Iron 12/10/2024 36 (L)  37 - 145 mcg/dL Final    Iron Saturation (TSAT) 12/10/2024 10 (L)  20 - 50 % Final    Transferrin 12/10/2024 233  200 - 360 mg/dL Final    TIBC 12/10/2024 347  298 - 536 mcg/dL Final    Ferritin 12/10/2024 21.30  13.00 - 150.00 ng/mL Final    Glucose 12/10/2024 128 (H)  65 - 99 mg/dL Final    BUN 12/10/2024 8  6 - 20 mg/dL Final    Creatinine 12/10/2024 0.71  0.57 - 1.00 mg/dL Final    Sodium 12/10/2024 136  136 - 145 mmol/L Final    Potassium 12/10/2024 3.8  3.5 - 5.2 mmol/L " Final    Chloride 12/10/2024 101  98 - 107 mmol/L Final    CO2 12/10/2024 24.0  22.0 - 29.0 mmol/L Final    Calcium 12/10/2024 9.1  8.6 - 10.5 mg/dL Final    Total Protein 12/10/2024 7.3  6.0 - 8.5 g/dL Final    Albumin 12/10/2024 4.3  3.5 - 5.2 g/dL Final    ALT (SGPT) 12/10/2024 19  1 - 33 U/L Final    AST (SGOT) 12/10/2024 21  1 - 32 U/L Final    Alkaline Phosphatase 12/10/2024 80  39 - 117 U/L Final    Total Bilirubin 12/10/2024 <0.2  0.0 - 1.2 mg/dL Final    Globulin 12/10/2024 3.0  gm/dL Final    A/G Ratio 12/10/2024 1.4  g/dL Final    BUN/Creatinine Ratio 12/10/2024 11.3  7.0 - 25.0 Final    Anion Gap 12/10/2024 11.0  5.0 - 15.0 mmol/L Final    eGFR 12/10/2024 121.2  >60.0 mL/min/1.73 Final   Office Visit on 12/10/2024   Component Date Value Ref Range Status    Color 12/10/2024 Yellow  Yellow, Straw, Dark Yellow, [place] Final    Clarity, UA 12/10/2024 Clear  Clear Final    Specific Gravity  12/10/2024 1.025  1.005 - 1.030 Final    pH, Urine 12/10/2024 6.0  5.0 - 8.0 Final    Leukocytes 12/10/2024 Negative  Negative Final    Nitrite, UA 12/10/2024 Negative  Negative Final    Protein, POC 12/10/2024 Trace (A)  Negative mg/dL Final    Glucose, UA 12/10/2024 Negative  Negative mg/dL Final    Ketones, UA 12/10/2024 Negative  Negative Final    Urobilinogen, UA 12/10/2024 Normal  Normal, 0.2 E.U./dL Final    Bilirubin 12/10/2024 Negative  Negative Final    Blood, UA 12/10/2024 Negative  Negative Final    Lot Number 12/10/2024 [id number]   Final    Expiration Date 12/10/2024 1,142,025   Final   Lab on 10/21/2024   Component Date Value Ref Range Status    Glucose 10/21/2024 80  65 - 99 mg/dL Final    BUN 10/21/2024 13  6 - 20 mg/dL Final    Creatinine 10/21/2024 1.07 (H)  0.57 - 1.00 mg/dL Final    Sodium 10/21/2024 134 (L)  136 - 145 mmol/L Final    Potassium 10/21/2024 4.5  3.5 - 5.2 mmol/L Final    Chloride 10/21/2024 99  98 - 107 mmol/L Final    CO2 10/21/2024 23.7  22.0 - 29.0 mmol/L Final    Calcium  10/21/2024 9.7  8.6 - 10.5 mg/dL Final    Total Protein 10/21/2024 7.7  6.0 - 8.5 g/dL Final    Albumin 10/21/2024 4.3  3.5 - 5.2 g/dL Final    ALT (SGPT) 10/21/2024 16  1 - 33 U/L Final    AST (SGOT) 10/21/2024 14  1 - 32 U/L Final    Alkaline Phosphatase 10/21/2024 71  39 - 117 U/L Final    Total Bilirubin 10/21/2024 <0.2  0.0 - 1.2 mg/dL Final    Globulin 10/21/2024 3.4  gm/dL Final    A/G Ratio 10/21/2024 1.3  g/dL Final    BUN/Creatinine Ratio 10/21/2024 12.1  7.0 - 25.0 Final    Anion Gap 10/21/2024 11.3  5.0 - 15.0 mmol/L Final    eGFR 10/21/2024 74.1  >60.0 mL/min/1.73 Final    Iron 10/21/2024 46  37 - 145 mcg/dL Final    Iron Saturation (TSAT) 10/21/2024 12 (L)  20 - 50 % Final    Transferrin 10/21/2024 256  200 - 360 mg/dL Final    TIBC 10/21/2024 381  298 - 536 mcg/dL Final    14.3.3 ETA, Rheum. Arthritis 10/21/2024 <0.20  ng/mL Final    Comment: Reference Range: < 0.20  Comments:  14-3-3 eta protein is a joint-derived, proinflammatory   that is implicated in the joint erosion process  and pathogenesis of RA.  Serum 14-3-3 eta is elevated in  both early and established RA.  - Diagnostic value:    14-3-3 eta is highly specific for RA.    Serum 14-3-3 eta may be especially helpful in identifying    patients with early RA where it provides a 15% incremental    benefit to the diagnostic sensitivity of markers,    Rheumatoid Arthritis (RA) Factor and Cyclic Citrullinated    Peptide (CCP) Antibodies, i.e. An additional 15% of early    RA patients may be detected by 14-3-3 eta (1).  - Correlation with radiographic evidence of joint damage.    Positive serum 14-3-3 eta levels are associated with    higher rates of joint damage as measured by radiographic    assessments (Sharp/van isamar Heijde Score) (2).  - Serum 14-3-3 eta levels above a threshold of 0.50 ng/ml    identify RA patients who will have more rapid radiographic                               progression, even those who may be in SDAI remission  (1).  References:  1. Eitan N, et al. Serum levels of 14-3-3 eta protein     supplement C-reactive protein and rheumatoid arthritis-     associated antibodies to predict clinical and     radiographic outcomes in a  prospective cohort of     patients with recent-onset inflammatory polyarthritis.     Arthritis Res Ther 2016;18:37  2. Shira FINNEY, et al. 14-3-3 eta is a novel      associated with the pathogenesis of rheumatoid arthritis     and joint damage. Arthritis Res Ther 2014;16:R99.  This test was developed and its performance characteristics  determined by [employer]. It has not been cleared or approved  by the Food and Drug Administration.    CCP Antibodies IgG/IgA 10/21/2024 5  0 - 19 units Final                              Negative               <20                            Weak positive      20 - 39                            Moderate positive  40 - 59                            Strong positive        >59    Rheumatoid Factor Quantitative 10/21/2024 10.0  0.0 - 14.0 IU/mL Final    TSH 10/21/2024 1.630  0.270 - 4.200 uIU/mL Final    SELWYN 10/21/2024 Positive (A)   Final                                         Negative   <1:80                                       Borderline  1:80                                       Positive   >1:80    Please note 10/21/2024 Comment   Final    SELWYN Multiplex methodology was designed to detect up to 11 antibodies  of the 100+ antibodies that may be detected by SELWYN IFA methodology.    C-Reactive Protein 10/21/2024 0.56 (H)  0.00 - 0.50 mg/dL Final    IgA 10/21/2024 118  87 - 352 mg/dL Final    Gliadin Deamidated Peptide Ab, IgA 10/21/2024 3  0 - 19 units Final                       Negative                   0 - 19                     Weak Positive             20 - 30                     Moderate to Strong Positive   >30    Deaminated Gliadin Ab IgG 10/21/2024 2  0 - 19 units Final                       Negative                   0 - 19                     Weak  Positive             20 - 30                     Moderate to Strong Positive   >30    Tissue Transglutaminase IgA 10/21/2024 <2  0 - 3 U/mL Final                                  Negative        0 -  3                                Weak Positive   4 - 10                                Positive           >10   Tissue Transglutaminase (tTG) has been identified   as the endomysial antigen.  Studies have demonstr-   ated that endomysial IgA antibodies have over 99%   specificity for gluten sensitive enteropathy.    Tissue Transglutaminase IgG 10/21/2024 4  0 - 5 U/mL Final                                  Negative        0 - 5                                Weak Positive   6 - 9                                Positive           >9    WBC 10/21/2024 13.24 (H)  3.40 - 10.80 10*3/mm3 Final    RBC 10/21/2024 5.09  3.77 - 5.28 10*6/mm3 Final    Hemoglobin 10/21/2024 13.5  12.0 - 15.9 g/dL Final    Hematocrit 10/21/2024 42.3  34.0 - 46.6 % Final    MCV 10/21/2024 83.1  79.0 - 97.0 fL Final    MCH 10/21/2024 26.5 (L)  26.6 - 33.0 pg Final    MCHC 10/21/2024 31.9  31.5 - 35.7 g/dL Final    RDW 10/21/2024 12.8  12.3 - 15.4 % Final    RDW-SD 10/21/2024 38.5  37.0 - 54.0 fl Final    MPV 10/21/2024 11.5  6.0 - 12.0 fL Final    Platelets 10/21/2024 408  140 - 450 10*3/mm3 Final    Neutrophil % 10/21/2024 61.8  42.7 - 76.0 % Final    Lymphocyte % 10/21/2024 31.2  19.6 - 45.3 % Final    Monocyte % 10/21/2024 4.3 (L)  5.0 - 12.0 % Final    Eosinophil % 10/21/2024 1.3  0.3 - 6.2 % Final    Basophil % 10/21/2024 0.7  0.0 - 1.5 % Final    Immature Grans % 10/21/2024 0.7 (H)  0.0 - 0.5 % Final    Neutrophils, Absolute 10/21/2024 8.19 (H)  1.70 - 7.00 10*3/mm3 Final    Lymphocytes, Absolute 10/21/2024 4.13 (H)  0.70 - 3.10 10*3/mm3 Final    Monocytes, Absolute 10/21/2024 0.57  0.10 - 0.90 10*3/mm3 Final    Eosinophils, Absolute 10/21/2024 0.17  0.00 - 0.40 10*3/mm3 Final    Basophils, Absolute 10/21/2024 0.09  0.00 - 0.20 10*3/mm3 Final     Immature Grans, Absolute 10/21/2024 0.09 (H)  0.00 - 0.05 10*3/mm3 Final    nRBC 10/21/2024 0.0  0.0 - 0.2 /100 WBC Final    Speckled Pattern 10/21/2024 1:80   Final    ICA nomenclature: AC-2,4,5,29    Note: (Reference) 10/21/2024 Comment   Final    Comment: Pattern              Potential Disease Association  -------------  ---------------------------------------------  Homogeneous    Systemic Lupus Erythematosus, Drug Induced                 Systemic Lupus Erythematosus, Chronic                 Autoimmune hepatitis, Juvenile Idiopathic                 Arthritis  -------------  ---------------------------------------------  Speckled       Sjogren Syndrome, Systemic Lupus                 Erythematosus, Subacute Cutaneous Lupus,                  Lupus, Congenital Heart Block,                 Mixed Connective Tissue Disease,                 Scleroderma-diffuse, Scleroderma-Autoimmune                 Myositis Overlap Syndrome, Systemic Lupus                 Nvtfincxwdwwb-Oxudxetnpaq-Juqtcxnpnr                 Myositis Overlap Syndrome, Systemic                 Autoimmune Rheumatic Disease,                 Undifferentiated Connective Tissue Disease  -------------  ---------------------------------------------  Nucleolar      Systemic                            Sclerosis, Scleroderma-Autoimmune                 Myositis Overlap Syndrome, Sjogren                 Syndrome, Raynaud phenomenon, Pulmonary                 Arterial Hypertension, Systemic Autoimmune                 Rheumatic Disease, Cancer  -------------  ---------------------------------------------  Centromere     Scleroderma-CREST, Limited Cutaneous SSc,                 Raynaud's Phenomenon, Primary Biliary                 Cholangitis  -------------  ---------------------------------------------  Nuclear Dot    Primary Biliary Cholangitis  -------------  ---------------------------------------------  Nuclear        Primary Biliary Cholangitis,  Autoimmune  Membrane       Hepatitis/Liver disease, Systemic Autoimmune                 Rheumatic Disease, Autoimmune Cytopenias,                 Linear Scleroderma, Antiphospholipid Syndrome  -------------  ---------------------------------------------    Anti-DNA (DS) Ab Qn 10/21/2024 4  0 - 9 IU/mL Final                                       Negative      <5                                     Equivocal  5 - 9                                     Positive      >9    RNP Antibodies 10/21/2024 0.3  0.0 - 0.9 AI Final    German Antibodies 10/21/2024 <0.2  0.0 - 0.9 AI Final    Antiscleroderma-70 Antibodies 10/21/2024 <0.2  0.0 - 0.9 AI Final    Sjogren's Anti-SS-A 10/21/2024 <0.2  0.0 - 0.9 AI Final    Sjogren's Anti-SS-B 10/21/2024 <0.2  0.0 - 0.9 AI Final    Antichromatin Antibodies 10/21/2024 <0.2  0.0 - 0.9 AI Final    CARLOS-1 IgG 10/21/2024 <0.2  0.0 - 0.9 AI Final    Anti-Centromere B Antibodies 10/21/2024 <0.2  0.0 - 0.9 AI Final    See below: 10/21/2024 Comment   Final    Comment: Autoantibody                       Disease Association  ------------------------------------------------------------                          Condition                  Frequency  ---------------------   ------------------------   ---------  Antinuclear Antibody,    SLE, mixed connective  Direct (SELWYN-D)           tissue diseases  ---------------------   ------------------------   ---------  dsDNA                    SLE                        40 - 60%  ---------------------   ------------------------   ---------  Chromatin                Drug induced SLE                90%                           SLE                        48 - 97%  ---------------------   ------------------------   ---------  SSA (Ro)                 SLE                        25 - 35%                           Sjogren's Syndrome         40 - 70%                            Lupus                 100%  ---------------------   ------------------------    ---------  SSB (La)                 SLE                                                        10%                           Sjogren's Syndrome              30%  ---------------------   -----------------------    ---------  Sm (anti-Smith)          SLE                        15 - 30%  ---------------------   -----------------------    ---------  RNP                      Mixed Connective Tissue                           Disease                         95%  (U1 nRNP,                SLE                        30 - 50%  anti-ribonucleoprotein)  Polymyositis and/or                           Dermatomyositis                 20%  ---------------------   ------------------------   ---------  Scl-70 (antiDNA          Scleroderma (diffuse)      20 - 35%  topoisomerase)           Crest                           13%  ---------------------   ------------------------   ---------  Keiko-1                     Polymyositis and/or                           Dermatomyositis            20 - 40%  ---------------------   ------------------------   ---------  Centromere B             Scleroderma -                            Crest                           variant                         80%       Assessment and Plan  Diagnoses and all orders for this visit:    1. Menorrhagia with regular cycle (Primary)  -     Ambulatory Referral to Gynecology    2. Generalized anxiety disorder  Comments:  Stop Lexapro and start Zoloft.  Orders:  -     sertraline (Zoloft) 50 MG tablet; Take 1 tablet by mouth Daily.  Dispense: 90 tablet; Refill: 2    3. Esophageal dysphagia  -     omeprazole (priLOSEC) 20 MG capsule; Take 1 capsule by mouth Daily.  Dispense: 30 capsule; Refill: 2    4. Iron deficiency anemia, unspecified iron deficiency anemia type  -     CBC w AUTO Differential; Future  -     Iron and TIBC; Future  -     Ferritin; Future      Assessment & Plan  1. Menorrhagia.  She reports heavy menstrual bleeding lasting 5 days, which started after her second  delivery. She has been advised to skip a couple of periods by going straight to the next pack of birth control pills after 3 weeks to reduce the frequency of her periods to six times a year. This approach aims to lower the risk of endometrial cancer and build up iron stores. If heavy bleeding occurs, she should take 3 days off and then start the next pack. A referral to Dr. Nash for further evaluation of potential endometriosis symptoms, including pain during sex and abdominal pain radiating to the hip, has been made. An iron level check will be conducted.    2. Constipation.  She experiences constipation due to iron supplementation and takes a daily stool softener, which has provided some relief. The plan is to discontinue the iron supplement if possible.    3. Migraines.  Her migraines are currently well-managed, and she has not needed to take Nurtec frequently. She took Nurtec during her last period due to severe symptoms.    4. Anxiety.  Her anxiety is currently well-managed.    Follow-up  The patient will follow up in 3 months.                New Medications:   New Medications Ordered This Visit   Medications    sertraline (Zoloft) 50 MG tablet     Sig: Take 1 tablet by mouth Daily.     Dispense:  90 tablet     Refill:  2    omeprazole (priLOSEC) 20 MG capsule     Sig: Take 1 capsule by mouth Daily.     Dispense:  30 capsule     Refill:  2       Discontinued Medications:   Medications Discontinued During This Encounter   Medication Reason    omeprazole (priLOSEC) 20 MG capsule Reorder    sertraline (Zoloft) 50 MG tablet Reorder                 Follow Up:   Return in about 3 months (around 6/25/2025), or needs labs.    Patient was given instructions and counseling regarding her condition or for health maintenance advice. Please see specific information pulled into the AVS if appropriate.     Patient or patient representative verbalized consent for the use of Ambient Listening during the visit with  Julio C  Domo Morales DO for chart documentation. 3/25/2025  13:28 EDT       This document has been electronically signed by Julio C Morales DO   March 25, 2025 13:22 EDT      Dictated Utilizing Dragon Dictation: Part of this note may be an electronic transcription/translation of spoken language to printed text using the Dragon Dictation System.

## 2025-03-26 LAB
FERRITIN SERPL-MCNC: 31.6 NG/ML (ref 13–150)
IRON 24H UR-MRATE: 79 MCG/DL (ref 37–145)
IRON SATN MFR SERPL: 21 % (ref 20–50)
TIBC SERPL-MCNC: 377 MCG/DL (ref 298–536)
TRANSFERRIN SERPL-MCNC: 253 MG/DL (ref 200–360)

## 2025-03-31 ENCOUNTER — RESULTS FOLLOW-UP (OUTPATIENT)
Dept: FAMILY MEDICINE CLINIC | Facility: CLINIC | Age: 25
End: 2025-03-31
Payer: COMMERCIAL

## 2025-04-24 DIAGNOSIS — R13.19 ESOPHAGEAL DYSPHAGIA: ICD-10-CM

## 2025-04-24 DIAGNOSIS — F41.1 GENERALIZED ANXIETY DISORDER: ICD-10-CM

## 2025-04-24 RX ORDER — OMEPRAZOLE 20 MG/1
20 CAPSULE, DELAYED RELEASE ORAL DAILY
Qty: 30 CAPSULE | Refills: 2 | OUTPATIENT
Start: 2025-04-24

## 2025-04-24 NOTE — TELEPHONE ENCOUNTER
Caller: Pricila Brown    Relationship: Self    Best call back number: [phone]     Requested Prescriptions:   Requested Prescriptions     Pending Prescriptions Disp Refills    omeprazole (priLOSEC) 20 MG capsule 30 capsule 2     Sig: Take 1 capsule by mouth Daily.    sertraline (Zoloft) 50 MG tablet 90 tablet 2     Sig: Take 1 tablet by mouth Daily.        Pharmacy where request should be sent: [facility] [id number] [address] AT [address] - [phone] [facility] [phone] FX     Last office visit with prescribing clinician: 3/25/2025   Last telemedicine visit with prescribing clinician: Visit date not found   Next office visit with prescribing clinician: 6/25/2025     Additional details provided by patient: PLEASE SEND 90 DAY SUPPLY WITH REFILLS. OUT OF OMEPRAZOLE- HAS 15 DAYS OF ZOLOFT LEFT    Does the patient have less than a 3 day supply:  [x] Yes  [] No    Zeinab Hart Rep   04/24/25 09:03 EDT

## 2025-05-29 ENCOUNTER — OFFICE VISIT (OUTPATIENT)
Dept: FAMILY MEDICINE CLINIC | Facility: CLINIC | Age: 25
End: 2025-05-29
Payer: COMMERCIAL

## 2025-05-29 VITALS
SYSTOLIC BLOOD PRESSURE: 116 MMHG | HEIGHT: 65 IN | BODY MASS INDEX: 32.65 KG/M2 | OXYGEN SATURATION: 99 % | TEMPERATURE: 97.5 F | WEIGHT: 196 LBS | DIASTOLIC BLOOD PRESSURE: 78 MMHG | HEART RATE: 98 BPM

## 2025-05-29 DIAGNOSIS — D50.9 IRON DEFICIENCY ANEMIA, UNSPECIFIED IRON DEFICIENCY ANEMIA TYPE: ICD-10-CM

## 2025-05-29 DIAGNOSIS — R13.19 ESOPHAGEAL DYSPHAGIA: ICD-10-CM

## 2025-05-29 DIAGNOSIS — F41.1 GENERALIZED ANXIETY DISORDER: ICD-10-CM

## 2025-05-29 DIAGNOSIS — M79.7 FIBROMYALGIA: ICD-10-CM

## 2025-05-29 DIAGNOSIS — N92.0 MENORRHAGIA WITH REGULAR CYCLE: Primary | ICD-10-CM

## 2025-05-29 DIAGNOSIS — D17.23 LIPOMA OF RIGHT LOWER EXTREMITY: ICD-10-CM

## 2025-05-29 PROCEDURE — 99214 OFFICE O/P EST MOD 30 MIN: CPT | Performed by: STUDENT IN AN ORGANIZED HEALTH CARE EDUCATION/TRAINING PROGRAM

## 2025-05-29 RX ORDER — AMITRIPTYLINE HYDROCHLORIDE 10 MG/1
10 TABLET ORAL NIGHTLY
Qty: 30 TABLET | Refills: 2 | Status: SHIPPED | OUTPATIENT
Start: 2025-05-29

## 2025-05-29 RX ORDER — OMEPRAZOLE 40 MG/1
40 CAPSULE, DELAYED RELEASE ORAL DAILY
Qty: 30 CAPSULE | Refills: 2 | Status: SHIPPED | OUTPATIENT
Start: 2025-05-29

## 2025-05-29 RX ORDER — SERTRALINE HYDROCHLORIDE 100 MG/1
100 TABLET, FILM COATED ORAL DAILY
Qty: 90 TABLET | Refills: 1 | Status: SHIPPED | OUTPATIENT
Start: 2025-05-29

## 2025-05-29 NOTE — PROGRESS NOTES
Chief Complaint  Leg Pain (Right leg)    HPI:   Leg Pain        Pricila Brown is a 24 y.o. female who presents today to [facility] for Leg Pain (Right leg).  History of Present Illness  The patient presents for evaluation of endometriosis, anxiety, ADHD, GERD, lipoma, fibromyalgia.    She reports an improvement in her menstrual cycle, with only one occurrence since her last visit. She continues to experience dyspareunia and persistent abdominal discomfort. She has a scheduled appointment with Dr. Nash in 06/2025.    Her migraines have shown significant improvement, with fewer episodes. She was previously on Nurtec, which coincided with her menstrual cycle.    She expresses concern about potential ADHD, citing impulsive decision-making and difficulty managing tasks as symptoms. She also reports occasional anxiety but notes an overall improvement in her condition. She is currently on a regimen of Zoloft 50 mg, increased from 25 mg.    She has identified lumps in her legs, which she suspects to be lipomas, contributing to her anxiety. She describes a deep bone ache in her leg that persists throughout the day, despite increased physical activity. She recalls similar lumps in her breasts during high school, which were diagnosed as cysts.    She experiences constant pain and fatigue, migraines, sleep disturbances, and irritable bowel syndrome. She mentions a significant decline in her health following her 's deployment when she was 4 months postpartum. She has a scheduled appointment with a rheumatologist next Tuesday.    Her acid reflux symptoms have improved following an increase in medication dosage from 20 mg to 40 mg. She reports no difficulty swallowing.       Previous History:   Past Medical History:   Diagnosis Date    Anxiety     Asthma     Dysfunctional gallbladder     Fast heart beat     GERD (gastroesophageal reflux disease)     Hx of migraines     Nausea      "Pain     RUQ      Past Surgical History:   Procedure Laterality Date    CHOLECYSTECTOMY N/A 2/2/2024    Procedure: CHOLECYSTECTOMY LAPAROSCOPIC WITH DAVINCI ROBOT;  Surgeon: Domo Mckeon MD;  Location: [facility] OR;  Service: Robotics - DaVinci;  Laterality: N/A;    D & C WITH SUCTION N/A 11/29/2018    Procedure: DILATATION AND CURETTAGE WITH SUCTION;  Surgeon: Estela Gupta DO;  Location: [facility];  Service: Obstetrics/Gynecology      Social History     Socioeconomic History    Marital status:    Tobacco Use    Smoking status: Never    Smokeless tobacco: Never   Vaping Use    Vaping status: Never Used   Substance and Sexual Activity    Alcohol use: No    Drug use: No    Sexual activity: Yes     Partners: Male     Birth control/protection: None      Health Maintenance Due   Topic Date Due    HPV VACCINES (1 - 3-dose series) Never done    ANNUAL PHYSICAL  Never done    PAP SMEAR  Never done    COVID-19 Vaccine (3 - 2024-25 season) 09/01/2024        Current Medications:  Current Outpatient Medications   Medication Sig Dispense Refill    norgestimate-ethinyl estradiol (ORTHO-CYCLEN) 0.25-35 MG-MCG per tablet Take 1 tablet by mouth Daily. 84 tablet 3    omeprazole (priLOSEC) 40 MG capsule Take 1 capsule by mouth Daily. 30 capsule 2    Rimegepant Sulfate (Nurtec) 75 MG tablet dispersible tablet Take 1 tablet by mouth Daily As Needed (migrain). 9 tablet 0    sertraline (Zoloft) 100 MG tablet Take 1 tablet by mouth Daily. 90 tablet 1    amitriptyline (ELAVIL) 10 MG tablet Take 1 tablet by mouth Every Night. 30 tablet 2     No current facility-administered medications for this visit.       Allergies:   Allergies   Allergen Reactions    Bactrim [Sulfamethoxazole-Trimethoprim] Hives    Penicillins Rash       Vitals:   /78 (BP Location: Right arm, Patient Position: Sitting, Cuff Size: Adult)   Pulse 98   Temp 97.5 °F (36.4 °C) (Temporal)   Ht 165.1 cm (65\")   Wt 88.9 kg (196 lb)   LMP 05/01/2025   " "SpO2 99%   BMI 32.62 kg/m²     Estimated body mass index is 32.62 kg/m² as calculated from the following:    Height as of this encounter: 165.1 cm (65\").    Weight as of this encounter: 88.9 kg (196 lb).    Pricila Brown  reports that she has never smoked. She has never used smokeless tobacco.            Physical Exam:   Physical Exam  Constitutional:       Appearance: Normal appearance.   HENT:      Mouth/Throat:      Mouth: Mucous membranes are moist.   Cardiovascular:      Rate and Rhythm: Normal rate and regular rhythm.   Pulmonary:      Effort: Pulmonary effort is normal.      Breath sounds: Normal breath sounds. No wheezing or rhonchi.   Musculoskeletal:         General: Normal range of motion.   Skin:     General: Skin is warm and dry.   Neurological:      Mental Status: She is alert.   Psychiatric:         Mood and Affect: Mood normal.          Lab Results:   Lab on 03/25/2025   Component Date Value Ref Range Status    Iron 03/25/2025 79  37 - 145 mcg/dL Final    Iron Saturation (TSAT) 03/25/2025 21  20 - 50 % Final    Transferrin 03/25/2025 253  200 - 360 mg/dL Final    TIBC 03/25/2025 377  298 - 536 mcg/dL Final    Ferritin 03/25/2025 31.60  13.00 - 150.00 ng/mL Final    WBC 03/25/2025 7.69  3.40 - 10.80 10*3/mm3 Final    RBC 03/25/2025 4.78  3.77 - 5.28 10*6/mm3 Final    Hemoglobin 03/25/2025 13.3  12.0 - 15.9 g/dL Final    Hematocrit 03/25/2025 39.5  34.0 - 46.6 % Final    MCV 03/25/2025 82.6  79.0 - 97.0 fL Final    MCH 03/25/2025 27.8  26.6 - 33.0 pg Final    MCHC 03/25/2025 33.7  31.5 - 35.7 g/dL Final    RDW 03/25/2025 12.1 (L)  12.3 - 15.4 % Final    RDW-SD 03/25/2025 36.5 (L)  37.0 - 54.0 fl Final    MPV 03/25/2025 10.9  6.0 - 12.0 fL Final    Platelets 03/25/2025 334  140 - 450 10*3/mm3 Final    Neutrophil % 03/25/2025 57.1  42.7 - 76.0 % Final    Lymphocyte % 03/25/2025 35.2  19.6 - 45.3 % Final    Monocyte % 03/25/2025 5.9  5.0 - 12.0 % Final    Eosinophil % 03/25/2025 1.2  0.3 - 6.2 % " Final    Basophil % 03/25/2025 0.5  0.0 - 1.5 % Final    Immature Grans % 03/25/2025 0.1  0.0 - 0.5 % Final    Neutrophils, Absolute 03/25/2025 4.39  1.70 - 7.00 10*3/mm3 Final    Lymphocytes, Absolute 03/25/2025 2.71  0.70 - 3.10 10*3/mm3 Final    Monocytes, Absolute 03/25/2025 0.45  0.10 - 0.90 10*3/mm3 Final    Eosinophils, Absolute 03/25/2025 0.09  0.00 - 0.40 10*3/mm3 Final    Basophils, Absolute 03/25/2025 0.04  0.00 - 0.20 10*3/mm3 Final    Immature Grans, Absolute 03/25/2025 0.01  0.00 - 0.05 10*3/mm3 Final    nRBC 03/25/2025 0.0  0.0 - 0.2 /100 WBC Final   Lab on 12/10/2024   Component Date Value Ref Range Status    WBC 12/10/2024 9.15  3.40 - 10.80 10*3/mm3 Final    RBC 12/10/2024 4.97  3.77 - 5.28 10*6/mm3 Final    Hemoglobin 12/10/2024 13.7  12.0 - 15.9 g/dL Final    Hematocrit 12/10/2024 40.8  34.0 - 46.6 % Final    MCV 12/10/2024 82.1  79.0 - 97.0 fL Final    MCH 12/10/2024 27.6  26.6 - 33.0 pg Final    MCHC 12/10/2024 33.6  31.5 - 35.7 g/dL Final    RDW 12/10/2024 12.3  12.3 - 15.4 % Final    RDW-SD 12/10/2024 36.5 (L)  37.0 - 54.0 fl Final    MPV 12/10/2024 12.0  6.0 - 12.0 fL Final    Platelets 12/10/2024 392  140 - 450 10*3/mm3 Final    Iron 12/10/2024 36 (L)  37 - 145 mcg/dL Final    Iron Saturation (TSAT) 12/10/2024 10 (L)  20 - 50 % Final    Transferrin 12/10/2024 233  200 - 360 mg/dL Final    TIBC 12/10/2024 347  298 - 536 mcg/dL Final    Ferritin 12/10/2024 21.30  13.00 - 150.00 ng/mL Final    Glucose 12/10/2024 128 (H)  65 - 99 mg/dL Final    BUN 12/10/2024 8  6 - 20 mg/dL Final    Creatinine 12/10/2024 0.71  0.57 - 1.00 mg/dL Final    Sodium 12/10/2024 136  136 - 145 mmol/L Final    Potassium 12/10/2024 3.8  3.5 - 5.2 mmol/L Final    Chloride 12/10/2024 101  98 - 107 mmol/L Final    CO2 12/10/2024 24.0  22.0 - 29.0 mmol/L Final    Calcium 12/10/2024 9.1  8.6 - 10.5 mg/dL Final    Total Protein 12/10/2024 7.3  6.0 - 8.5 g/dL Final    Albumin 12/10/2024 4.3  3.5 - 5.2 g/dL Final    ALT (SGPT)  12/10/2024 19  1 - 33 U/L Final    AST (SGOT) 12/10/2024 21  1 - 32 U/L Final    Alkaline Phosphatase 12/10/2024 80  39 - 117 U/L Final    Total Bilirubin 12/10/2024 <0.2  0.0 - 1.2 mg/dL Final    Globulin 12/10/2024 3.0  gm/dL Final    A/G Ratio 12/10/2024 1.4  g/dL Final    BUN/Creatinine Ratio 12/10/2024 11.3  7.0 - 25.0 Final    Anion Gap 12/10/2024 11.0  5.0 - 15.0 mmol/L Final    eGFR 12/10/2024 121.2  >60.0 mL/min/1.73 Final   Office Visit on 12/10/2024   Component Date Value Ref Range Status    Color 12/10/2024 Yellow  Yellow, Straw, Dark Yellow, [place] Final    Clarity, UA 12/10/2024 Clear  Clear Final    Specific Gravity  12/10/2024 1.025  1.005 - 1.030 Final    pH, Urine 12/10/2024 6.0  5.0 - 8.0 Final    Leukocytes 12/10/2024 Negative  Negative Final    Nitrite, UA 12/10/2024 Negative  Negative Final    Protein, POC 12/10/2024 Trace (A)  Negative mg/dL Final    Glucose, UA 12/10/2024 Negative  Negative mg/dL Final    Ketones, UA 12/10/2024 Negative  Negative Final    Urobilinogen, UA 12/10/2024 Normal  Normal, 0.2 E.U./dL Final    Bilirubin 12/10/2024 Negative  Negative Final    Blood, UA 12/10/2024 Negative  Negative Final    Lot Number 12/10/2024 [id number]   Final    Expiration Date 12/10/2024 1,[id number]   Final       Assessment and Plan  Diagnoses and all orders for this visit:    1. Iron deficiency anemia, unspecified iron deficiency anemia type (Primary)  -     Iron and TIBC; Future  -     Ferritin; Future    2. Esophageal dysphagia  -     omeprazole (priLOSEC) 40 MG capsule; Take 1 capsule by mouth Daily.  Dispense: 30 capsule; Refill: 2    3. Menorrhagia with regular cycle  -     CBC w AUTO Differential; Future  -     Comprehensive metabolic panel; Future    4. Generalized anxiety disorder  Comments:  Stop Lexapro and start Zoloft.  Orders:  -     sertraline (Zoloft) 100 MG tablet; Take 1 tablet by mouth Daily.  Dispense: 90 tablet; Refill: 1    5. Fibromyalgia  -     amitriptyline (ELAVIL) 10  MG tablet; Take 1 tablet by mouth Every Night.  Dispense: 30 tablet; Refill: 2      Assessment & Plan  1. Endometriosis.  - Only had one period since last visit, with improvement noted.  - Continues to experience pain with intercourse and abdominal pain.  - Advised to consider definitive diagnosis and potential laparoscopy during consultation with Dr. Nash in 06/2025.    2. SHAI  - Reports overall improvement but still experiences occasional anxiety.  - Zoloft dosage increased from 50 mg to 100 mg.  - Referral to a therapist will be made for further management.    3. Attention Deficit Hyperactivity Disorder (ADHD).  - Patient reports impulsivity, task overwhelm, and difficulty focusing.  - Referral to Maribel Blum, APRNs specializing in ADHD, will be made for further evaluation.    4. Gastroesophageal Reflux Disease (GERD).  - Symptoms improved with increased dosage from 20 mg to 40 mg.  - Medication will be continued at 40 mg for a total duration of 6 months.  - Advised that long-term use of medication is not recommended.    5. Lipoma.  - Lumps in legs identified as lipomas, not associated with pain.  - Low likelihood of blood clot; no signs of leg swelling.  - X-ray of the leg will be ordered if pain persists.    6. Fibromyalgia.  - Symptoms include generalized pain, fatigue, migraines, trouble sleeping, and IBS.  - Physical exam findings consistent with fibromyalgia.  - Discussed potential benefits of Cymbalta and amitriptyline.  - Amitriptyline 10 mg once at night prescribed.  - Encouraged to continue exercising and consider joining a fibromyalgia support group.    Follow-up  The patient will follow up in 3 months.               New Medications:   New Medications Ordered This Visit   Medications    omeprazole (priLOSEC) 40 MG capsule     Sig: Take 1 capsule by mouth Daily.     Dispense:  30 capsule     Refill:  2    sertraline (Zoloft) 100 MG tablet     Sig: Take 1 tablet by mouth Daily.     Dispense:  90  tablet     Refill:  1    amitriptyline (ELAVIL) 10 MG tablet     Sig: Take 1 tablet by mouth Every Night.     Dispense:  30 tablet     Refill:  2       Discontinued Medications:   Medications Discontinued During This Encounter   Medication Reason    omeprazole (priLOSEC) 20 MG capsule Reorder    sertraline (Zoloft) 50 MG tablet Reorder                 Follow Up:   Return in about 3 months (around 8/29/2025), or Needs follow up with Maribel or Viktoria for eval for ADHD, also needs follow up with new therapist.    Patient was given instructions and counseling regarding her condition or for health maintenance advice. Please see specific information pulled into the AVS if appropriate.     Patient or patient representative verbalized consent for the use of Ambient Listening during the visit with  Julio C Morales DO for chart documentation. 5/29/2025  10:24 EDT       This document has been electronically signed by Julio C Morales DO   May 29, 2025 10:22 EDT      Dictated Utilizing Dragon Dictation: Part of this note may be an electronic transcription/translation of spoken language to printed text using the Dragon Dictation System.

## 2025-07-03 ENCOUNTER — OFFICE VISIT (OUTPATIENT)
Dept: PSYCHIATRY | Facility: CLINIC | Age: 25
End: 2025-07-03
Payer: COMMERCIAL

## 2025-07-03 VITALS
OXYGEN SATURATION: 98 % | BODY MASS INDEX: 33.82 KG/M2 | SYSTOLIC BLOOD PRESSURE: 123 MMHG | WEIGHT: 203 LBS | HEIGHT: 65 IN | HEART RATE: 84 BPM | DIASTOLIC BLOOD PRESSURE: 80 MMHG

## 2025-07-03 DIAGNOSIS — F90.2 ADHD (ATTENTION DEFICIT HYPERACTIVITY DISORDER), COMBINED TYPE: ICD-10-CM

## 2025-07-03 DIAGNOSIS — F42.2 MIXED OBSESSIONAL THOUGHTS AND ACTS: Primary | ICD-10-CM

## 2025-07-03 PROCEDURE — 90792 PSYCH DIAG EVAL W/MED SRVCS: CPT

## 2025-07-03 RX ORDER — CLONIDINE HYDROCHLORIDE 0.1 MG/1
0.1 TABLET ORAL NIGHTLY
Qty: 30 TABLET | Refills: 1 | Status: SHIPPED | OUTPATIENT
Start: 2025-07-03

## 2025-07-03 RX ORDER — SERTRALINE HYDROCHLORIDE 100 MG/1
150 TABLET, FILM COATED ORAL DAILY
Qty: 45 TABLET | Refills: 1 | Status: SHIPPED | OUTPATIENT
Start: 2025-07-03 | End: 2025-09-01

## 2025-07-03 NOTE — PROGRESS NOTES
Subjective   Pricila Brown is a 24 y.o. female who is here today for initial appointment to evaluate for medication options.  Referral from Dr. Morales.    Chief Complaint: ADHD concerns    History of Present Illness  Patient reports that she began experiencing anxiety symptomology in early childhood.  Anxiety has remained present however changes from time to time.  She reports significant worsening of anxiety after the birth of her children.  She identifies that she often references what if scenarios and catastrophic outcomes.  She reports a tendency to obsess over her symptoms, spending extensive time researching them. She experiences intrusive thoughts, such as feeling responsible for others' well-being, which can lead to emotional distress. She has a fear of contamination and germs, which sometimes intensifies. For instance, she avoids cooking chicken due to fear of salmonella infection. She also engages in constant hand washing, which has led to eczema in the past. She has difficulty sleeping unless her pillow is perfectly positioned and the blanket feels right. She checks on her children at night and rechecks locked doors. She seeks reassurance from others about her thoughts. > 3 hrs per day. She has experienced depression since having children, particularly after her 's deployment when their second child was 4 months old. However does not feel depression is problematic at this time. Patient endorses the following symptoms which have persisted since prior to 11 years of age and has negatively impacted directly on their social and academic/occupational activities including: Often fails to give close attention to details or makes careless mistakes, has difficulties sustaining attention in tasks (i.e.difficulty remaining focused during lectures or conversations or with long reading material), often does not listen when spoken directly easily distracted, does not follow through with instructions and  "fails to finish schoolwork and becoming easily sidetracked or forgetting homework.  Patient has a difficult time organizing task and activities with poor time management as well as keeping materials and items of daily use.  Often avoids tasks that require sustained attention.  Patient is also been forgetful in regards to tasks as well as daily chores.  Patient identifies that since childhood she Often fidgets with hands or squirms in states, previously would leave his/her seat in the middle of the class, would often runs or climbs when inappropriate, and was unable to engage in leisure play quietly, often \"on the go\", talks excessively, finds her self having a difficult time not completing other sentences, has a hard time waiting his or her turn, and interrupts or intrudes on conversations with others.  She struggles with motivation and organization and can become stressed if she is not where she needs to be. She can become easily irritated and stimulated. She has not experienced any significant trauma but does have flashbacks. She has always had difficulty sleeping, often taking until midnight to fall asleep even if she lies down at 9 PM. She typically gets 4 to 6 hours of sleep and wakes up with headaches a couple of times a week.  She denies any restricting, binging, or purging in regards to body image concerns. Body mass index is 33.78 kg/m².  She identifies that she is a picky eater.  Denies any mood elevations suggestive of denia and or hypomania.  She denies any self-harm behaviors.  Denies AVH.  Denies self-harm.  Denies SI and HI.    Past Psych History: Patient denies any history of present inpatient psychiatric hospitalizations/providers.  Denies history of TBI or seizures.  Denies any knowledge of cardiac diagnosis.  Does endorse history of asthma.  Denies any knowledge of  complications or exposure to illicit substance or toxins while in utero.    Previous Psych Meds: Lexapro, worsening of " symptomology, nausea.  Currently on Zoloft since December, has helped some.  Elavil for sleep, ineffective per patient report.    Substance Abuse: Patient has a history of or present illicit substance use, EtOH, nicotine.  Variable caffeine intake.    Social History: Patient was born and raised in [place].  Often traveled related to father's job as a .  Has lived previously in [place], [place], in [place].  1 sister.  High school graduate.  Some college.  Currently employed at [employer].   x 7 years.  2 daughters ages 4 and 2.  Denies any  service.  Denies any previous incarcerations or pending litigation.    Family Psychiatric History:  family history includes Hypertension in her father.    Medical/Surgical History:  Past Medical History:   Diagnosis Date    Anxiety     Asthma     Dysfunctional gallbladder     Fast heart beat     GERD (gastroesophageal reflux disease)     Hx of migraines     Nausea     Pain     RUQ     Past Surgical History:   Procedure Laterality Date    CHOLECYSTECTOMY N/A 2/2/2024    Procedure: CHOLECYSTECTOMY LAPAROSCOPIC WITH DAVINCI ROBOT;  Surgeon: Domo Mckeon MD;  Location: [facility];  Service: Robotics - [facility];  Laterality: N/A;    D & C WITH SUCTION N/A 11/29/2018    Procedure: DILATATION AND CURETTAGE WITH SUCTION;  Surgeon: Estela Gupta DO;  Location: [facility];  Service: Obstetrics/Gynecology       Allergies   Allergen Reactions    Bactrim [Sulfamethoxazole-Trimethoprim] Hives    Penicillins Rash       Current Medications:   Current Outpatient Medications   Medication Sig Dispense Refill    norgestimate-ethinyl estradiol (ORTHO-CYCLEN) 0.25-35 MG-MCG per tablet Take 1 tablet by mouth Daily. 84 tablet 3    omeprazole (priLOSEC) 40 MG capsule Take 1 capsule by mouth Daily. 30 capsule 2    Rimegepant Sulfate (Nurtec) 75 MG tablet dispersible tablet Take 1 tablet by mouth Daily As Needed (migrain). 9 tablet 0    sertraline (Zoloft) 100 MG tablet  Take 1.5 tablets by mouth Daily for 60 days. 45 tablet 1    cloNIDine (CATAPRES) 0.1 MG tablet Take 1 tablet by mouth Every Night. 30 tablet 1     No current facility-administered medications for this visit.         Review of Systems   Constitutional:  Positive for activity change and appetite change.   HENT: Negative.     Eyes: Negative.    Respiratory: Negative.     Cardiovascular: Negative.    Gastrointestinal: Negative.    Endocrine: Negative.    Genitourinary: Negative.    Musculoskeletal: Negative.    Skin: Negative.    Allergic/Immunologic: Negative.    Neurological: Negative.    Hematological: Negative.    Psychiatric/Behavioral:  Positive for agitation, decreased concentration and sleep disturbance. The patient is nervous/anxious.     denies HEENT, cardiovascular, respiratory, liver, renal, GI/, endocrine, neuro, DERM, hematology, immunology, musculoskeletal disorders.    Objective   Physical Exam  Vitals reviewed.   Constitutional:       Appearance: Normal appearance.   HENT:      Head: Normocephalic.      Nose: Nose normal.      Mouth/Throat:      Mouth: Mucous membranes are moist.      Pharynx: Oropharynx is clear.   Eyes:      Extraocular Movements: Extraocular movements intact.      Pupils: Pupils are equal, round, and reactive to light.   Cardiovascular:      Rate and Rhythm: Normal rate.   Pulmonary:      Effort: Pulmonary effort is normal.   Musculoskeletal:         General: Normal range of motion.      Cervical back: Normal range of motion.   Skin:     General: Skin is warm and dry.   Neurological:      General: No focal deficit present.      Mental Status: She is alert and oriented to person, place, and time.   Psychiatric:         Attention and Perception: Perception normal. She is inattentive.         Mood and Affect: Affect normal. Mood is anxious.         Speech: Speech normal.         Behavior: Behavior normal. Behavior is cooperative.         Thought Content: Thought content normal.         " Cognition and Memory: Cognition and memory normal.         Judgment: Judgment normal. Judgment is not impulsive.     Blood pressure 123/80, pulse 84, height 165.1 cm (65\"), weight 92.1 kg (203 lb), SpO2 98%, not currently breastfeeding.    Mental Status Exam:   Hygiene:   good  Cooperation:  Cooperative  Eye Contact:  Fair  Psychomotor Behavior:  Restless  Affect:  Full range and Appropriate  Hopelessness: Denies  Speech:  Normal  Thought Process:  Goal directed and Linear  Thought Content:  Normal and Mood congruent  Suicidal:  None  Homicidal:  None  Hallucinations:  None  Delusion:  None  Memory:  Intact  Orientation:  Person, Place, Time, and Situation  Reliability:  fair  Insight:  Fair  Judgement:  Fair  Impulse Control:  Fair  Physical/Medical Issues:  No       Short-term goals: Patient will be compliant with clinic appointments.  Patient will be engaged in therapy, medication compliant with minimal side effects. Patient  will report decrease of symptoms and frequency.    Long-term goals: Patient will have minimal symptoms of  with continued medication management. Patient will be compliant with treatment and appointments.     Strengths: Support, motivation for treatment, insight  Weaknesses: Coping    Prognosis: Guarded dependent on medication/follow up and treatment plan compliance.  Functional Status:moderate impairment in areas of daily functioning.    Assessment & Plan   Diagnoses and all orders for this visit:    1. Mixed obsessional thoughts and acts (Primary)  -     sertraline (Zoloft) 100 MG tablet; Take 1.5 tablets by mouth Daily for 60 days.  Dispense: 45 tablet; Refill: 1    2. ADHD (attention deficit hyperactivity disorder), combined type  -     cloNIDine (CATAPRES) 0.1 MG tablet; Take 1 tablet by mouth Every Night.  Dispense: 30 tablet; Refill: 1        Assessment & Plan      - UDS collected and pending  - CPT 3 conducted resulting in T-scores suggesting difficulty with inattention, impulsivity " and vigilance  - Increase Zoloft to 150 mg p.o. daily for possessive compulsive disorder  - Discontinue Elavil  - Start clonidine 0.1 mg p.o. nightly for sleep, anxiety, ADHD  - Recommend psychotherapy  - Medications submitted to pharmacy at this time      Discussed medication and psychotherapy options.  Symptomology and chronicity of symptomology is consistent with obsessive-compulsive disorder and co-occurring ADHD.  Patient also has a strong family history of OCD, biological father has a diagnosis.  Patient is to increase Zoloft as above to better target obsessive compulsive tendencies.  Clonidine at this time for ADHD.  If patient is having difficulty with sleep at next encounter may consider low dose clomipramine.  I've explained to her that drugs of the SSRI class can have side effects such as weight gain, sexual dysfunction, insomnia, headache, nausea.  Discussed the clonidine has potential to reduce blood pressure, reduce heart rate, dry mouth, dizziness, fatigue.  Discussed the risks, benefits, and side effects of the medication; client acknowledged and verbally consented.  Patient is aware to contact the [facility] with any worsening of symptom.  Patient is agreeable to go to the ER or call 911 should they begin SI/HI.      Return in about 6 weeks (around 8/14/2025), or if symptoms worsen or fail to improve, for Next scheduled follow up.        This document has been electronically signed by OSBALDO Maldonado   July 3, 2025 16:50 EDT     Please note that portions of this note were completed with a voice recognition program.     Patient or patient representative verbalized consent for the use of Ambient Listening during the visit with  OSBALDO Maldonado for chart documentation. 7/3/2025  16:49 EDT

## 2025-08-14 ENCOUNTER — TELEMEDICINE (OUTPATIENT)
Dept: PSYCHIATRY | Facility: CLINIC | Age: 25
End: 2025-08-14
Payer: COMMERCIAL

## 2025-08-14 DIAGNOSIS — F42.2 MIXED OBSESSIONAL THOUGHTS AND ACTS: ICD-10-CM

## 2025-08-14 DIAGNOSIS — F90.2 ADHD (ATTENTION DEFICIT HYPERACTIVITY DISORDER), COMBINED TYPE: ICD-10-CM

## 2025-08-14 PROCEDURE — 99214 OFFICE O/P EST MOD 30 MIN: CPT

## 2025-08-14 RX ORDER — SERTRALINE HYDROCHLORIDE 100 MG/1
200 TABLET, FILM COATED ORAL DAILY
Qty: 60 TABLET | Refills: 1 | Status: SHIPPED | OUTPATIENT
Start: 2025-08-14 | End: 2025-10-13

## 2025-08-14 RX ORDER — CLONIDINE HYDROCHLORIDE 0.1 MG/1
0.1 TABLET ORAL NIGHTLY
Qty: 30 TABLET | Refills: 1 | Status: SHIPPED | OUTPATIENT
Start: 2025-08-14

## (undated) DEVICE — TBG W FLTR FOR BERKELEY SYSTEM

## (undated) DEVICE — PAD POSTN ARMBND 1P/U

## (undated) DEVICE — SUT VIC 0/0 UR6 27IN DYED J603H

## (undated) DEVICE — PAD GRND REM POLYHESIVE A/ DISP

## (undated) DEVICE — PK LAP GEN 70

## (undated) DEVICE — SXN/IRR STRYKEFLOW2 W/TIPS 5 32CM

## (undated) DEVICE — CVR SURG STND MAYO 24X53IN STRL

## (undated) DEVICE — HK CAUTRY ENDOWRIST PERM MONO DAVINCI/X/XI 8MM REUS

## (undated) DEVICE — GRSPR ENDOWRIST FRCP CADIERE DAVINCI/X/XI 8MM 18/REUS

## (undated) DEVICE — SYS FLUID MGMT HYST SAFETOUCH

## (undated) DEVICE — DRP ARM DAVINCI/X/XI DISP BX20

## (undated) DEVICE — CVR DISP HUG U VAC STEEP TREND

## (undated) DEVICE — COR MINOR LITHOTOMY: Brand: MEDLINE INDUSTRIES, INC.

## (undated) DEVICE — PAD XL W/HD REST DERMAPROX ARM PROTECT PNK

## (undated) DEVICE — PAD SANI MAXI W/ADHS SNG WRP 11IN

## (undated) DEVICE — RETRV BG SPECI MONARCH UNIV

## (undated) DEVICE — SUT MNCRYL 4/0 PS2 18 IN

## (undated) DEVICE — Device

## (undated) DEVICE — UNDERGLV SURG BIOGEL INDICAT PF 8 GRN

## (undated) DEVICE — HOSE BT TO BT VAC CURETTAGE SNGL PT USE

## (undated) DEVICE — SEAL CANN ENDOWRIST DAVINCI/X/XI 5TO8MM

## (undated) DEVICE — NDL INSUFL VERRES 14G 120MM C2201

## (undated) DEVICE — ST COL BERKELY TBG

## (undated) DEVICE — GLV SURG PREMIERPRO MIC LTX PF SZ7.5 BRN

## (undated) DEVICE — CANN VAC GYN VACURETTE BERKELEY CRV 10MM 1P/U STRL

## (undated) DEVICE — OBT BLADLES ENDOWRIST DAVINCI/X/XI 8MM DISP

## (undated) DEVICE — TBG SXN CONN/M 1/4IN 20FT LF STRL

## (undated) DEVICE — DRP SURG UTIL W/TP 15X26IN